# Patient Record
Sex: FEMALE | Employment: UNEMPLOYED | URBAN - METROPOLITAN AREA
[De-identification: names, ages, dates, MRNs, and addresses within clinical notes are randomized per-mention and may not be internally consistent; named-entity substitution may affect disease eponyms.]

---

## 2019-06-17 ENCOUNTER — TELEPHONE (OUTPATIENT)
Dept: DERMATOLOGY | Facility: CLINIC | Age: 34
End: 2019-06-17

## 2019-06-17 NOTE — TELEPHONE ENCOUNTER
Health Call Center    Phone Message    May a detailed message be left on voicemail: yes    Reason for Call: Other: [facility] has referred this Pt to Dr Farias but there are no schedule dates open.  [facility] requests we ask the backline if they can find anything and contact the Pt to schedule. Records being faxed today    Action Taken: Message routed to:  [facility] ([facility] dermatology

## 2019-06-18 NOTE — TELEPHONE ENCOUNTER
Pt called regarding referral for HS. She would like the clinic to be aware that she also has Diabetes and due to that and her HS, that she has multiple open wounds on her legs. Pt is requesting a call as soon as possible to schedule. Thanks!

## 2019-06-18 NOTE — TELEPHONE ENCOUNTER
Left voicemail asking to call clinic back. Clinic number provided. Patient can still schedule their new HS appt with Dr. Farias, but Dr. Farias is scheduling out into August/September right now.  To be seen sooner, patient can be seen in our resident continuity clinic. There's 2 openings next week with Dr. Vidal that we can schedule patient in, or Dr. Diaz as a new extended in July.        Shea Talamantes LPN

## 2019-07-16 NOTE — TELEPHONE ENCOUNTER
Contacted patient and offered the continuity clinic and an appointment with Dr. Diaz. She is addiment to have an appointment with Dr. Farias due to what she has heard about him. She says she is willing to wait until November if needed.  Please schedule this patient when a new HS becomes availble.    Remy Griffiths, [facility]

## 2019-07-16 NOTE — TELEPHONE ENCOUNTER
[facility] Call Center    Phone Message    May a detailed message be left on voicemail: yes    Reason for Call: Other: Patient called back to schedule an Appointment with Dr. Farias. Stated her current provider recomments him because he specializes in her condition. No appointments are available as of 07/16. Would like a call back to schedule. Please advise.      Action Taken: Message routed to:  [facility] ([facility] Dermatology

## 2019-08-25 NOTE — TELEPHONE ENCOUNTER
FUTURE VISIT INFORMATION      FUTURE VISIT INFORMATION:    Date: 10.24.19    Time: 3:30    Location: [facility]  REFERRAL INFORMATION:    Referring provider:  Dr. Antolin Chao    Referring providers clinic:  [facility]    Reason for visit/diagnosis: new HS    RECORDS REQUESTED FROM:       Clinic name Comments Records Status Imaging Status   [facility] Multiple visits with Dr. Chao over the past 2 years relating to possible HS In [facility] [place] Multiple visits in the ER over the past 2 years relating to possible HS In Epic

## 2019-10-24 ENCOUNTER — OFFICE VISIT (OUTPATIENT)
Dept: DERMATOLOGY | Facility: CLINIC | Age: 34
End: 2019-10-24

## 2019-10-24 ENCOUNTER — PRE VISIT (OUTPATIENT)
Dept: DERMATOLOGY | Facility: CLINIC | Age: 34
End: 2019-10-24

## 2019-10-24 VITALS — DIASTOLIC BLOOD PRESSURE: 86 MMHG | SYSTOLIC BLOOD PRESSURE: 129 MMHG | HEART RATE: 95 BPM

## 2019-10-24 DIAGNOSIS — L73.2 HIDRADENITIS SUPPURATIVA: Primary | ICD-10-CM

## 2019-10-24 RX ORDER — BENZOYL PEROXIDE 10 G/100G
SUSPENSION TOPICAL
Qty: 227 G | Refills: 3 | Status: SHIPPED | OUTPATIENT
Start: 2019-10-24 | End: 2020-07-29

## 2019-10-24 RX ORDER — MULTIVIT WITH IRON,MINERALS
1 TABLET,CHEWABLE ORAL DAILY
COMMUNITY

## 2019-10-24 RX ORDER — CLOBETASOL PROPIONATE 0.5 MG/G
CREAM TOPICAL 2 TIMES DAILY
Qty: 30 G | Refills: 1 | Status: SHIPPED | OUTPATIENT
Start: 2019-10-24

## 2019-10-24 RX ORDER — CLINDAMYCIN PHOSPHATE 10 UG/ML
LOTION TOPICAL 2 TIMES DAILY
Qty: 60 ML | Refills: 3 | Status: SHIPPED | OUTPATIENT
Start: 2019-10-24

## 2019-10-24 RX ORDER — SERTRALINE HYDROCHLORIDE 100 MG/1
100 TABLET, FILM COATED ORAL DAILY
COMMUNITY
Start: 2019-10-21

## 2019-10-24 RX ORDER — ASCORBIC ACID 500 MG
500 TABLET ORAL DAILY
COMMUNITY

## 2019-10-24 RX ORDER — BLOOD-GLUCOSE METER
1 EACH MISCELLANEOUS 3 TIMES DAILY
COMMUNITY
Start: 2019-06-18

## 2019-10-24 RX ORDER — SPIRONOLACTONE 50 MG/1
TABLET, FILM COATED ORAL
Qty: 60 TABLET | Refills: 3 | Status: SHIPPED | OUTPATIENT
Start: 2019-10-24 | End: 2020-01-23

## 2019-10-24 RX ORDER — METFORMIN HYDROCHLORIDE 1000 MG/1
1000 TABLET, FILM COATED, EXTENDED RELEASE ORAL 2 TIMES DAILY
COMMUNITY
Start: 2019-10-07

## 2019-10-24 RX ORDER — TOPIRAMATE 25 MG/1
25 TABLET, FILM COATED ORAL DAILY
COMMUNITY
Start: 2019-10-21

## 2019-10-24 RX ORDER — OXYCODONE HYDROCHLORIDE 5 MG/1
1 TABLET ORAL PRN
COMMUNITY
Start: 2019-06-25

## 2019-10-24 ASSESSMENT — PAIN SCALES - GENERAL: PAINLEVEL: NO PAIN (0)

## 2019-10-24 NOTE — PATIENT INSTRUCTIONS
Wash every day with benzoyl peroxide wash in shower  -for flares or inflamed areas of HS: apply clindamycin lotion 2 x per day to affected areas and also apply clobetasol 0.05% cream 1-2x per day until they resolve.  - Take 50 mg spironolactone once per day for 1 week, and then if doing well can increase to 100mg daily    Please call our clinic if you develop a flare in your HS

## 2019-10-24 NOTE — PROGRESS NOTES
[facility] Dermatology Note    Dermatology Clinic  [facility]  [facility]  [address] [place] [address]    Dermatology Problem List:  1. Hidradenitis suppurativa  - current tx: clobetasol, BPO wash, clindamycin, spironolactone 50mg BID to be increased to 100mg BID if pt tolerates it  - Currently controlling diabetes with 2000mg metformin  - A former smoker; quit about 2 years ago  - Placed referral to derm surg for HS excision of L axilla    Encounter Date: Oct 24, 2019    CC:  Chief Complaint   Patient presents with     Derm Problem     HS, groin, axillae       History of Present Illness:  Ms. Her De La Rosa is a 34 year old female who presents for evaluation of Hidradenitis suppurativa. The pt was referred by Dr. Antolin Chao of [facility] in [place]. The pt last saw Dr. Chao on 8/21/19 for evaluation for a cyst on the back of the neck, just posterior to a recent excision of epidermal cyst on the R posterior neck done on 6/25/19. However, it was noted that it may be a reactive lymph node. After removal she was referred to me for HS. Today she is accompanied by her child son and a dog. She reports experiencing HS since 15-16 years of age. Reports that she thought they were ingrown hairs at first. She has had minimal medical treatment and has been going to the ER to get this drained. Had a course of doxycycline and a course of keflex only in the past. At home, she is just washing with Dial soap. Reports that she will use pain medications to help with the HS.     Pt reports that the L axilla and L groin are most active. Reports that the L axilla is ongoing and bothers her.    Former smoker and quit about 2 years ago with significant improvement in her HS. Has also cut out dairy from her diet. She is on 2000 mg of metformin for diabetes and has taken it for 2 years now. She states that only about 6 months ago did she take it consistently,  and it has helped with the HS.    In the past 3 months the pt has not had any flare-ups. She feels that the metformin has contributed to this. However, she notes that she has changed her diet such as cutting out dairy about 6 months ago and has stopped smoking. Pt felt she had to stop smoking due to exacerbation of HS.  She is also interested in beriatric Sx.    Within 6 months, she has only had 1 flare-up where she went to the ED for I&D.    Denies that she has flare-ups with the menstrual cycle.  Has not tried prednisone or ILK.    Reports taking tumeric for anti-inflammation. Topically, she is just using neosporin; she feels that a band-aid placed over lesions also helps. Reports that her mood is good.      Social History:  Patient reports that she has quit smoking. She smoked 0.00 packs per day. She has never used smokeless tobacco.    Medications:  Current Outpatient Medications   Medication Sig Dispense Refill     albuterol (PROVENTIL) 40 mg in sodium chloride 32 mL continuous nebulization solution Inhale 1-2 puffs into the lungs as needed       Blood Glucose Monitoring Suppl (GLUCOCOM BLOOD GLUCOSE MONITOR) CANDIDO 1 Dose by Other route 3 times daily       calcium citrate-vitamin D (CITRACAL) 315-250 MG-UNIT TABS per tablet Take 2 tablets by mouth daily       childrens multivitamin w/iron (FLINTSTONES COMPLETE) chewable tablet Take 1 tablet by mouth daily       metFORMIN modified (GLUMETZA) 1000 MG 24 hr tablet Take 1,000 mg by mouth 2 times daily       oxyCODONE (ROXICODONE) 5 MG tablet Take 1 tablet by mouth as needed       sertraline (ZOLOFT) 100 MG tablet Take 100 mg by mouth daily       topiramate (TOPAMAX) 25 MG tablet Take 25 mg by mouth daily       vitamin C (ASCORBIC ACID) 500 MG tablet Take 500 mg by mouth daily         No Known Allergies    Review of Systems:  -As per HPI  -Constitutional: Otherwise feeling well today, in usual state of health.  -Skin: As above in HPI. No additional skin  concerns.    Physical exam:  Vitals: /86 (BP Location: Right arm)   Pulse 95   GEN: This is a well developed, well-nourished female in no acute distress, in a pleasant mood.    SKIN: Focused examination of the axillas bilaterally, inframammaries bilaterally, lower extremities bilaterally, groin and neck were performed.  -Acanthosis nigricans with 3x pink PG-like papules in the R axilla  -L axilla, acanthosis nigricans. There is a cord in the center that is a bit thickened and mildly tender to palpation, 1+ erythema and 1+ induration  -Open comedones in inframammary  -Tunnel in L inferior axilla that is 6 cm in diameter  -3x puncta mildly hyperpigmented flat papules on L inframammary region, not currently active  -R upper thigh there are 2x violaceous nodules  -Suprapubic region 4x violaceous nodules  -L upper thigh, 4x inflammatory nodules with 1x draining  -Well-healed scar on posterior neck, few tombstone comedones on the back of the neck  -No other lesions of concern on areas examined.       Impression/Plan:  1. Hidradenitis suppurativa.    Start spironolactone 50 mg daily, to be increased to 100 mg daily after a week if pt tolerates it. Discussed the risks and benefits of spironolactone, and pt would like to proceed.    Prescribed clobetasol 0.05% cream on individual lesions BID prn flares.    Prescribed BPO 10% wash.    Prescribed clindamycin 1% gel for active lesions.    For more severe flare-ups, pt was counseled to call here. Discussed various ways to move forward with flare-ups such as prednisone taper, ILK injections, or excision.    Referral placed to derm surgery for L axilla.    She will find a dermatologist in her area, [place], for ongoing care, and when she lets us know who this is, we can call and touch base with them.    CC Referred MD Gopal  No address on file on close of this encounter.    Follow-up in 3 months.      Staff Involved:    Scribe Disclosure  I, Ramona De La Rosa, am serving as a  scribe to document services personally performed by Dr. Nemesio Farias, based on data collection and the provider's statements to me.     Provider Disclosure:   The documentation recorded by the scribe accurately reflects the services I personally performed and the decisions made by me.    Nemesio Farias MD, FAAD    Departments of Internal Medicine and Dermatology  [facility]  [phone]

## 2019-10-24 NOTE — LETTER
10/24/2019       RE: Her De La Rosa  [address] [place] [address]     Dear Colleague,    Thank you for referring your patient, Her Rj, to the [facility] DERMATOLOGY at [facility]. Please see a copy of my visit note below.    [facility] Dermatology Note    Dermatology Clinic  [facility]  [address] [place] [address]    Dermatology Problem List:  1. Hidradenitis suppurativa  - current tx: clobetasol, BPO wash, clindamycin, spironolactone 50mg BID to be increased to 100mg BID if pt tolerates it  - Currently controlling diabetes with 2000mg metformin  - A former smoker; quit about 2 years ago  - Placed referral to derm surg for HS excision of L axilla    Encounter Date: Oct 24, 2019    CC:  Chief Complaint   Patient presents with     Derm Problem     HS, groin, axillae       History of Present Illness:  Ms. Her De La Rosa is a 34 year old female who presents for evaluation of Hidradenitis suppurativa. The pt was referred by Dr. Antolin Chao of [facility] in [place]. The pt last saw Dr. Chao on 8/21/19 for evaluation for a cyst on the back of the neck, just posterior to a recent excision of epidermal cyst on the R posterior neck done on 6/25/19. However, it was noted that it may be a reactive lymph node. After removal she was referred to me for HS. Today she is accompanied by her child son and a dog. She reports experiencing HS since 15-18 years of age. Reports that she thought they were ingrown hairs at first. She has had minimal medical treatment and has been going to the ER to get this drained. Had a course of doxycycline and a course of keflex only in the past. At home, she is just washing with Dial soap. Reports that she will use pain medications to help with the HS.     Pt reports that the L axilla and L groin are most active. Reports that the L axilla is ongoing and bothers  her.    Former smoker and quit about 2 years ago with significant improvement in her HS. Has also cut out dairy from her diet. She is on 2000 mg of metformin for diabetes and has taken it for 2 years now. She states that only about 6 months ago did she take it consistently, and it has helped with the HS.    In the past 3 months the pt has not had any flare-ups. She feels that the metformin has contributed to this. However, she notes that she has changed her diet such as cutting out dairy about 6 months ago and has stopped smoking. Pt felt she had to stop smoking due to exacerbation of HS.  She is also interested in beriatric Sx.    Within 6 months, she has only had 1 flare-up where she went to the ED for I&D.    Denies that she has flare-ups with the menstrual cycle.  Has not tried prednisone or ILK.    Reports taking tumeric for anti-inflammation. Topically, she is just using neosporin; she feels that a band-aid placed over lesions also helps. Reports that her mood is good.      Social History:  Patient reports that she has quit smoking. She smoked 0.00 packs per day. She has never used smokeless tobacco.    Medications:  Current Outpatient Medications   Medication Sig Dispense Refill     albuterol (PROVENTIL) 40 mg in sodium chloride 32 mL continuous nebulization solution Inhale 1-2 puffs into the lungs as needed       Blood Glucose Monitoring Suppl (GLUCOCOM BLOOD GLUCOSE MONITOR) CANDIDO 1 Dose by Other route 3 times daily       calcium citrate-vitamin D (CITRACAL) 315-250 MG-UNIT TABS per tablet Take 2 tablets by mouth daily       childrens multivitamin w/iron (FLINTSTONES COMPLETE) chewable tablet Take 1 tablet by mouth daily       metFORMIN modified (GLUMETZA) 1000 MG 24 hr tablet Take 1,000 mg by mouth 2 times daily       oxyCODONE (ROXICODONE) 5 MG tablet Take 1 tablet by mouth as needed       sertraline (ZOLOFT) 100 MG tablet Take 100 mg by mouth daily       topiramate (TOPAMAX) 25 MG tablet Take 25 mg by  mouth daily       vitamin C (ASCORBIC ACID) 500 MG tablet Take 500 mg by mouth daily         No Known Allergies    Review of Systems:  -As per HPI  -Constitutional: Otherwise feeling well today, in usual state of health.  -Skin: As above in HPI. No additional skin concerns.    Physical exam:  Vitals: /86 (BP Location: Right arm)   Pulse 95   GEN: This is a well developed, well-nourished female in no acute distress, in a pleasant mood.    SKIN: Focused examination of the axillas bilaterally, inframammaries bilaterally, lower extremities bilaterally, groin and neck were performed.  -Acanthosis nigricans with 3x pink PG-like papules in the R axilla  -L axilla, acanthosis nigricans. There is a cord in the center that is a bit thickened and mildly tender to palpation, 1+ erythema and 1+ induration  -Open comedones in inframammary  -Tunnel in L inferior axilla that is 6 cm in diameter  -3x puncta mildly hyperpigmented flat papules on L inframammary region, not currently active  -R upper thigh there are 2x violaceous nodules  -Suprapubic region 4x violaceous nodules  -L upper thigh, 4x inflammatory nodules with 1x draining  -Well-healed scar on posterior neck, few tombstone comedones on the back of the neck  -No other lesions of concern on areas examined.       Impression/Plan:  1. Hidradenitis suppurativa.    Start spironolactone 50 mg daily, to be increased to 100 mg daily after a week if pt tolerates it. Discussed the risks and benefits of spironolactone, and pt would like to proceed.    Prescribed clobetasol 0.05% cream on individual lesions BID prn flares.    Prescribed BPO 10% wash.    Prescribed clindamycin 1% gel for active lesions.    For more severe flare-ups, pt was counseled to call here. Discussed various ways to move forward with flare-ups such as prednisone taper, ILK injections, or excision.    Referral placed to derm surgery for L axilla.    She will find a dermatologist in her area, [place], for  ongoing care, and when she lets us know who this is, we can call and touch base with them.    CC Referred Self, MD  No address on file on close of this encounter.    Follow-up in 3 months.      Staff Involved:    Scribe Disclosure  I, Ramona Rj, am serving as a scribe to document services personally performed by Dr. Nemesio Farias, based on data collection and the provider's statements to me.     Provider Disclosure:   The documentation recorded by the scribe accurately reflects the services I personally performed and the decisions made by me.    Nemesio Farias MD, FAAD    Departments of Internal Medicine and Dermatology  [facility]  [phone]

## 2019-10-24 NOTE — NURSING NOTE
Chief Complaint   Patient presents with     Derm Problem     HS, groin, axillae     Carla Rodriguez, EMT

## 2019-10-25 ENCOUNTER — TELEPHONE (OUTPATIENT)
Dept: DERMATOLOGY | Facility: CLINIC | Age: 34
End: 2019-10-25

## 2019-11-06 NOTE — TELEPHONE ENCOUNTER
FUTURE VISIT INFORMATION      FUTURE VISIT INFORMATION:    Date: 11.13.19    Time: 2:00    Location:  DermSurg  REFERRAL INFORMATION:    Referring provider:  Dr. Nemesio Farias    Referring providers clinic:  [facility]    Reason for visit/diagnosis  HS    RECORDS REQUESTED FROM:       Clinic name Comments Records Status Imaging Status   [facility] 10.24.19 Dr. Farias Epic    [facility] Multiple visits with Dr. Chao over the past 2 years relating to possible HS  [facility] Multiple visits in the ER over the past 2 years relating to possible HS  Epic

## 2019-11-13 ENCOUNTER — PRE VISIT (OUTPATIENT)
Dept: DERMATOLOGY | Facility: CLINIC | Age: 34
End: 2019-11-13

## 2019-11-13 ENCOUNTER — OFFICE VISIT (OUTPATIENT)
Dept: DERMATOLOGY | Facility: CLINIC | Age: 34
End: 2019-11-13

## 2019-11-13 DIAGNOSIS — L73.2 HIDRADENITIS SUPPURATIVA: Primary | ICD-10-CM

## 2019-11-13 ASSESSMENT — PAIN SCALES - GENERAL: PAINLEVEL: MILD PAIN (2)

## 2019-11-13 NOTE — NURSING NOTE
Chief Complaint   Patient presents with     Consult     Her is here today for a consult for HS excisions for her underarms and groin area.      Felicia Clancy, CMA

## 2019-11-13 NOTE — LETTER
11/13/2019       RE: Her De La Rosa  [address] [address]  [address]  [place] [address]     Dear Colleague,    Thank you for referring your patient, Her Rj, to the [facility] DERMATOLOGIC SURGERY at [facility]. Please see a copy of my visit note below.    [facility] Dermatology Note  Dermatology Surgery Clinic  [facility]  [address] [place] [address]    Dermatology Problem List:  1. Hidradenitis suppurativa  - current tx: clobetasol, BPO wash, clindamycin, spironolactone 50mg BID to be increased to 100mg BID if pt tolerates it  - Currently controlling diabetes with 2000mg metformin  - A former smoker; quit about 2 years ago  - Hx of excision at the neck  - s/p excision of the L axilla TBD    Encounter Date: Nov 13, 2019  Chief Complaint   Patient presents with     Consult     Her is here today for a consult for HS excisions for her underarms and groin area.      History of Present Illness:  Ms. Her De La Rosa is a 34 year old female who presents today for consultation of an HS excision on the L axilla. The patient was referred by Dr. Farias of  Derm. She last saw Dr. Farias on 10/24/19 for an HS consult. At the time she reported active regions at the L axilla and L groin and opted to pursue excision of the L axilla. At today's visit she continues having drainage at the L axilla. It has been draining for about 6 months now. She has had HS excision on the neck prior, and it has resolved since then. No artificial joints or implants. She is planning to undergoing bariatric Sx in February.The patient is otherwise feeling well. There are no other skin concerns at this time.    Past Medical History:   There is no problem list on file for this patient.    No past medical history on file.  No past surgical history on file.    Social History:  Social History     Socioeconomic History     Marital status: Single     Spouse  name: None     Number of children: None     Years of education: None     Highest education level: None   Occupational History     None   Social Needs     Financial resource strain: None     Food insecurity:     Worry: None     Inability: None     Transportation needs:     Medical: None     Non-medical: None   Tobacco Use     Smoking status: Former Smoker     Packs/day: 0.00     Smokeless tobacco: Never Used   Substance and Sexual Activity     Alcohol use: None     Drug use: None     Sexual activity: None   Lifestyle     Physical activity:     Days per week: None     Minutes per session: None     Stress: None   Relationships     Social connections:     Talks on phone: None     Gets together: None     Attends [employer] service: None     Active member of club or organization: None     Attends meetings of clubs or organizations: None     Relationship status: None     Intimate partner violence:     Fear of current or ex partner: None     Emotionally abused: None     Physically abused: None     Forced sexual activity: None   Other Topics Concern     Parent/sibling w/ CABG, MI or angioplasty before 65F 55M? Not Asked   Social History Narrative     None     Family History:  No family history on file.     Medications:  Current Outpatient Medications   Medication Sig Dispense Refill     albuterol (PROVENTIL) 40 mg in sodium chloride 32 mL continuous nebulization solution Inhale 1-2 puffs into the lungs as needed       benzoyl peroxide (PANOXYL) 10 % external liquid Use daily as directed in shower 227 g 3     Blood Glucose Monitoring Suppl (GLUCOCOM BLOOD GLUCOSE MONITOR) CANDIDO 1 Dose by Other route 3 times daily       calcium citrate-vitamin D (CITRACAL) 315-250 MG-UNIT TABS per tablet Take 2 tablets by mouth daily       childrens multivitamin w/iron (FLINTSTONES COMPLETE) chewable tablet Take 1 tablet by mouth daily       clindamycin (CLEOCIN T) 1 % external lotion Apply topically 2 times daily Apply to affected and inflamed  areas of HS 60 mL 3     clobetasol (TEMOVATE) 0.05 % external cream Apply topically 2 times daily Apply a thin layer to active and inflamed areas of HS 1-2 x per day until they improve. 30 g 1     metFORMIN modified (GLUMETZA) 1000 MG 24 hr tablet Take 1,000 mg by mouth 2 times daily       oxyCODONE (ROXICODONE) 5 MG tablet Take 1 tablet by mouth as needed       sertraline (ZOLOFT) 100 MG tablet Take 100 mg by mouth daily       spironolactone (ALDACTONE) 50 MG tablet Take 50mg (1 tab) daily for 1 week, and if tolerating increase to 100mg (tabs) daily 60 tablet 3     topiramate (TOPAMAX) 25 MG tablet Take 25 mg by mouth daily       vitamin C (ASCORBIC ACID) 500 MG tablet Take 500 mg by mouth daily       No Known Allergies    Review of Systems:  As per HPI.  -Skin Establ Pt: The patient denies any new rash, pruritus, or lesions that are symptomatic, changing or bleeding, except as per HPI.  -Constitutional: The patient is feeling generally well.    Physical exam:  Vitals: There were no vitals taken for this visit.  GEN: This is a well developed, well-nourished female in no acute distress, in a pleasant mood.   SKIN: Focused examination of the face, neck, and axillas bilaterally, suprapubic bilaterally, inguinal folds bilaterally were performed.  -Relative well-healed scar on the R lateral neck  -Scarred nodules, sinus tracts, and chords overlying the R axilla Area of structural disease measures 10.5 x 6.0 cm and there is 1x active draining sinus. Open 1.0 cm sinus with granulating base over medial axilla  -Scarred nodules, sinus tracts, and chords overlying the L axilla Area of structural disease measures 9.5 x 11.0 cm and there is 1x active draining sinus. Open 1.0 cm sinus with granulating base over medial axilla  -Inflammatory nodules and draining sinuses over the infrapannus and suprapubic areas  -Bilateral inguinal folds with scarred nodules and draining sinus tracts, L>R  - No other lesions of concern on areas  examined.     Impression/Plan:  1. Hidradenitis suppurativa. Excision recommended of the L axilla, 10.5 x 6.0 cm structural disease site.    Reviewed pathology report and nature of diagnosis. Plan for excision on L axilla first. She is also planning to undergo bariatric Sx in February.    Risks, benefits, and process of HS excision surgery were discussed including possibility of damage to surrounding anatomical structures and infection. Patient is comfortable proceeding with the surgery; consent form was obtained. She will be scheduled at her convenience.    No indication for pre-op antibiotics.    Pre-op written instructions provided.       Follow-up as scheduled for excision.     Staff Involved:  Scribe Disclosure  I, Ramona De La Rosa, am serving as a scribe to document services personally performed by Dr. Anjel Recio, based on data collection and the provider's statements to me.     Attending Attestation  I attest that the Scribe recorded the interview and exam that I personally performed.  I have reviewed the note and edited it as necessary.    Anjel Recio M.D.  Professor  Director of Dermatologic Surgery  Department of Dermatology  [facility]

## 2019-11-13 NOTE — PROGRESS NOTES
[facility] Dermatology Note    Dermatology Surgery Clinic  [facility]  Clinics and Surgery Center  [address] [place] [id number]    Dermatology Problem List:  1. Hidradenitis suppurativa  - current tx: clobetasol, BPO wash, clindamycin, spironolactone 50mg BID to be increased to 100mg BID if pt tolerates it  - Currently controlling diabetes with 2000mg metformin  - A former smoker; quit about 2 years ago  - Hx of excision at the neck  - s/p excision of the L axilla TBD    Encounter Date: Nov 13, 2019    CC:  Chief Complaint   Patient presents with     Consult     Her is here today for a consult for HS excisions for her underarms and groin area.        History of Present Illness:  Ms. Her De La Rosa is a 34 year old female who presents today for consultation of an HS excision on the L axilla. The patient was referred by Dr. Farias of  Derm. She last saw Dr. Farias on 10/24/19 for an HS consult. At the time she reported active regions at the L axilla and L groin and opted to pursue excision of the L axilla. At today's visit she continues having drainage at the L axilla. It has been draining for about 6 months now. She has had HS excision on the neck prior, and it has resolved since then. No artificial joints or implants. She is planning to undergoing bariatric Sx in February.The patient is otherwise feeling well. There are no other skin concerns at this time.      Past Medical History:   There is no problem list on file for this patient.    No past medical history on file.  No past surgical history on file.    Social History:  Social History     Socioeconomic History     Marital status: Single     Spouse name: None     Number of children: None     Years of education: None     Highest education level: None   Occupational History     None   Social Needs     Financial resource strain: None     Food insecurity:     Worry: None     Inability: None     Transportation needs:      Medical: None     Non-medical: None   Tobacco Use     Smoking status: Former Smoker     Packs/day: 0.00     Smokeless tobacco: Never Used   Substance and Sexual Activity     Alcohol use: None     Drug use: None     Sexual activity: None   Lifestyle     Physical activity:     Days per week: None     Minutes per session: None     Stress: None   Relationships     Social connections:     Talks on phone: None     Gets together: None     Attends [employer] service: None     Active member of club or organization: None     Attends meetings of clubs or organizations: None     Relationship status: None     Intimate partner violence:     Fear of current or ex partner: None     Emotionally abused: None     Physically abused: None     Forced sexual activity: None   Other Topics Concern     Parent/sibling w/ CABG, MI or angioplasty before 65F 55M? Not Asked   Social History Narrative     None       Family History:  No family history on file.     Medications:  Current Outpatient Medications   Medication Sig Dispense Refill     albuterol (PROVENTIL) 40 mg in sodium chloride 32 mL continuous nebulization solution Inhale 1-2 puffs into the lungs as needed       benzoyl peroxide (PANOXYL) 10 % external liquid Use daily as directed in shower 227 g 3     Blood Glucose Monitoring Suppl (GLUCOCOM BLOOD GLUCOSE MONITOR) CANDIDO 1 Dose by Other route 3 times daily       calcium citrate-vitamin D (CITRACAL) 315-250 MG-UNIT TABS per tablet Take 2 tablets by mouth daily       childrens multivitamin w/iron (FLINTSTONES COMPLETE) chewable tablet Take 1 tablet by mouth daily       clindamycin (CLEOCIN T) 1 % external lotion Apply topically 2 times daily Apply to affected and inflamed areas of HS 60 mL 3     clobetasol (TEMOVATE) 0.05 % external cream Apply topically 2 times daily Apply a thin layer to active and inflamed areas of HS 1-2 x per day until they improve. 30 g 1     metFORMIN modified (GLUMETZA) 1000 MG 24 hr tablet Take 1,000 mg by  mouth 2 times daily       oxyCODONE (ROXICODONE) 5 MG tablet Take 1 tablet by mouth as needed       sertraline (ZOLOFT) 100 MG tablet Take 100 mg by mouth daily       spironolactone (ALDACTONE) 50 MG tablet Take 50mg (1 tab) daily for 1 week, and if tolerating increase to 100mg (tabs) daily 60 tablet 3     topiramate (TOPAMAX) 25 MG tablet Take 25 mg by mouth daily       vitamin C (ASCORBIC ACID) 500 MG tablet Take 500 mg by mouth daily         No Known Allergies    Review of Systems:  As per HPI.  -Skin Establ Pt: The patient denies any new rash, pruritus, or lesions that are symptomatic, changing or bleeding, except as per HPI.  -Constitutional: The patient is feeling generally well.    Physical exam:  Vitals: There were no vitals taken for this visit.  GEN: This is a well developed, well-nourished female in no acute distress, in a pleasant mood.   SKIN: Focused examination of the face, neck, and axillas bilaterally, suprapubic bilaterally, inguinal folds bilaterally were performed.  -Relative well-healed scar on the R lateral neck  -Scarred nodules, sinus tracts, and chords overlying the R axilla Area of structural disease measures 10.5 x 6.0 cm and there is 1x active draining sinus. Open 1.0 cm sinus with granulating base over medial axilla  -Scarred nodules, sinus tracts, and chords overlying the L axilla Area of structural disease measures 9.5 x 11.0 cm and there is 1x active draining sinus. Open 1.0 cm sinus with granulating base over medial axilla  -Inflammatory nodules and draining sinuses over the infrapannus and suprapubic areas  -Bilateral inguinal folds with scarred nodules and draining sinus tracts, L>R  - No other lesions of concern on areas examined.       Impression/Plan:  1. Hidradenitis suppurativa. Excision recommended of the L axilla, 10.5 x 6.0 cm structural disease site.    Reviewed pathology report and nature of diagnosis. Plan for excision on L axilla first. She is also planning to undergo  bariatric Sx in February.    Risks, benefits, and process of HS excision surgery were discussed including possibility of damage to surrounding anatomical structures and infection. Patient is comfortable proceeding with the surgery; consent form was obtained. She will be scheduled at her convenience.    No indication for pre-op antibiotics.    Pre-op written instructions provided.     Follow-up as scheduled for excision.       Staff Involved:    Scribe Disclosure  I, Ramona Rj, am serving as a scribe to document services personally performed by Dr. Anjel Recio, based on data collection and the provider's statements to me.     Attending Attestation  I attest that the Scribe recorded the interview and exam that I personally performed.  I have reviewed the note and edited it as necessary.    Anjel Recio M.D.  Professor  Director of Dermatologic Surgery  Department of Dermatology  [facility]

## 2019-12-10 ENCOUNTER — TELEPHONE (OUTPATIENT)
Dept: DERMATOLOGY | Facility: CLINIC | Age: 34
End: 2019-12-10

## 2019-12-10 DIAGNOSIS — L73.2 HIDRADENITIS SUPPURATIVA: Primary | ICD-10-CM

## 2019-12-10 RX ORDER — DOXYCYCLINE 100 MG/1
100 CAPSULE ORAL 2 TIMES DAILY
Qty: 28 CAPSULE | Refills: 0 | Status: SHIPPED | OUTPATIENT
Start: 2019-12-10

## 2019-12-11 NOTE — TELEPHONE ENCOUNTER
Received page as resident on call for HS flare. Patient states that she has been flaring significantly under the stomach for about 2-3 days. She has been unable to move and it is very painful. She has been taking spironolactone and using topicals. It had been well controlled until recently. She would like to go to her local urgent care clinic [facility] to have kenalog injections and possible I&D. Will send prescription for doxycycline 100mg BID x 14 days to help with flare. Sent message to scheduling pool to have patient be seen in clinic tomorrow if possible since she will be in [place]. She will be seeing Dr. Recio on 12/16/19 for excision of left axilla.

## 2019-12-11 NOTE — TELEPHONE ENCOUNTER
Called pt and she said that she will not be able to come in today due to other obligations. Pt states that tomorrow would work better. I have her scheduled to see Dr. Navas on 12/12/19 at 4:30PM. Pt understood and had no questions or concerns.  HERLINDA Chung

## 2019-12-16 ENCOUNTER — TELEPHONE (OUTPATIENT)
Dept: DERMATOLOGY | Facility: CLINIC | Age: 34
End: 2019-12-16

## 2019-12-16 NOTE — TELEPHONE ENCOUNTER
Called patient back after she called stating that she would not be able to make appointment on time today. Dr. Recio wants her to reschedule. Pt is rescheduled for January. Pt also stated that she is flaring and needs to be seen soon, preferably with Dr. Farias. Will route message to Dr. Farias. Pt understood, no other concerns or questions.

## 2019-12-19 ENCOUNTER — OFFICE VISIT (OUTPATIENT)
Dept: DERMATOLOGY | Facility: CLINIC | Age: 34
End: 2019-12-19
Payer: COMMERCIAL

## 2019-12-19 DIAGNOSIS — L73.2 HIDRADENITIS SUPPURATIVA: Primary | ICD-10-CM

## 2019-12-19 RX ORDER — OXYCODONE HYDROCHLORIDE 5 MG/1
5 TABLET ORAL EVERY 6 HOURS PRN
Qty: 12 TABLET | Refills: 0 | Status: SHIPPED | OUTPATIENT
Start: 2019-12-19

## 2019-12-19 ASSESSMENT — PAIN SCALES - GENERAL: PAINLEVEL: SEVERE PAIN (6)

## 2019-12-19 NOTE — PROGRESS NOTES
oxycDermatology Clinic  [facility]  Clinics and Surgery Center  [address] [place] [address]     Dermatology Problem List:  1. Hidradenitis suppurativa  - current tx: clobetasol, BPO wash, clindamycin, spironolactone 50mg BID to be increased to 100mg BID if pt tolerates it  - Currently controlling diabetes with 2000mg metformin  - A former smoker; quit about 2 years ago  - Placed referral to derm surg for HS excision of L axilla     Encounter Date: December 19, 2019  LV: Oct 24, 2019     CC:       Chief Complaint   Patient presents with     Derm Problem       HS, groin, axillae      Interval Hx    She currently gets 1-2 a month   She flared in the rt groin. Went to ED and opened it and injected it with steroid which helped. It started coming back and she wants to get another injection    History of Present Illness:  Ms. Her De La Rsoa is a 34 year old female who presents for evaluation of Hidradenitis suppurativa. The pt was referred by Dr. Antolin Chao of [facility] in [place]. The pt last saw Dr. Chao on 8/21/19 for evaluation for a cyst on the back of the neck, just posterior to a recent excision of epidermal cyst on the R posterior neck done on 6/25/19. However, it was noted that it may be a reactive lymph node. After removal she was referred to me for HS. Today she is accompanied by her child son and a dog. She reports experiencing HS since 15-16 years of age. Reports that she thought they were ingrown hairs at first. She has had minimal medical treatment and has been going to the ER to get this drained. Had a course of doxycycline and a course of keflex only in the past. At home, she is just washing with Dial soap. Reports that she will use pain medications to help with the HS.      Pt reports that the L axilla and L groin are most active. Reports that the L axilla is ongoing and bothers her.     Former smoker and quit about 2 years ago with significant improvement in  her HS. Has also cut out dairy from her diet. She is on 2000 mg of metformin for diabetes and has taken it for 2 years now. She states that only about 6 months ago did she take it consistently, and it has helped with the HS.     In the past 3 months the pt has not had any flare-ups. She feels that the metformin has contributed to this. However, she notes that she has changed her diet such as cutting out dairy about 6 months ago and has stopped smoking. Pt felt she had to stop smoking due to exacerbation of HS.  She is also interested in beriatric Sx.     Within 6 months, she has only had 1 flare-up where she went to the ED for I&D.     Denies that she has flare-ups with the menstrual cycle.  Has not tried prednisone or ILK.     Reports taking tumeric for anti-inflammation. Topically, she is just using neosporin; she feels that a band-aid placed over lesions also helps. Reports that her mood is good.        Social History:  Patient reports that she has quit smoking. She smoked 0.00 packs per day. She has never used smokeless tobacco.     Current Outpatient Medications   Medication     oxyCODONE (ROXICODONE) 5 MG tablet     albuterol (PROVENTIL) 40 mg in sodium chloride 32 mL continuous nebulization solution     benzoyl peroxide (PANOXYL) 10 % external liquid     Blood Glucose Monitoring Suppl (GLUCOCOM BLOOD GLUCOSE MONITOR) CANDIDO     calcium citrate-vitamin D (CITRACAL) 315-250 MG-UNIT TABS per tablet     childrens multivitamin w/iron (FLINTSTONES COMPLETE) chewable tablet     clindamycin (CLEOCIN T) 1 % external lotion     clobetasol (TEMOVATE) 0.05 % external cream     doxycycline monohydrate (MONODOX) 100 MG capsule     metFORMIN modified (GLUMETZA) 1000 MG 24 hr tablet     oxyCODONE (ROXICODONE) 5 MG tablet     sertraline (ZOLOFT) 100 MG tablet     spironolactone (ALDACTONE) 50 MG tablet     topiramate (TOPAMAX) 25 MG tablet     vitamin C (ASCORBIC ACID) 500 MG tablet     Current Facility-Administered Medications    Medication     triamcinolone (KENALOG-40) injection 40 mg       No Known Allergies    Review of Systems:  -As per HPI  -Constitutional: Otherwise feeling well today, in usual state of health.  -Skin: As above in HPI. No additional skin concerns.     Physical exam:    GEN: This is a well developed, well-nourished female in no acute distress, in a pleasant mood.    SKIN: Focused examination of lower abdomen/suprapubic region focal exam   - Two inflammatory nodules in suprapubic region  -No other lesions of concern on areas examined.        Impression/Plan:  1. Hidradenitis suppurativa.    Continue  spironolactone 100 mg daily.     Injected two nodules with 1cc each of kenalog-20    Continue topical clobetasol 0.05% cream on individual lesions BID prn flares.    Continue BPO 10% wash.    Continue clindamycin 1% gel for active lesions.    Given oxycodone 12 pills for severe flares     Referral placed at last visit to derm surgery for L axilla.    She will find a dermatologist in her area, [place], for ongoing care, and when she lets us know who this is, we can call and touch base with them.     CC Referred Self, MD  No address on file on close of this encounter.     Follow-up in 1 month        Staff Involved:     Scribe Disclosure  I, Ramona De La Rosa, am serving as a scribe to document services personally performed by Dr. Nemesio Farias, based on data collection and the provider's statements to me.      Provider Disclosure:   The documentation recorded by the scribe accurately reflects the services I personally performed and the decisions made by me.     Nemesio Farias MD, FAAD    Departments of Internal Medicine and Dermatology  [facility]  [phone]

## 2019-12-19 NOTE — NURSING NOTE
Drug Administration Record    Prior to injection, verified patient identity using patient's name and date of birth.  Due to injection administration, patient instructed to remain in clinic for 15 minutes  afterwards, and to report any adverse reaction to me immediately.    Drug Name: triamcinolone acetonide(kenalog)  Dose: 2mL of triamcinolone 20mg/mL, 40mg dose  Route administered: ID  NDC #: [id number]: Kenalog-40 ([address]-1)  Amount of waste(mL):0  Reason for waste: Multi dose vial    LOT #: [id number]  SITE: body  : [employer]  EXPIRATION DATE: 07/2021

## 2019-12-19 NOTE — LETTER
12/19/2019       RE: Her De La Rosa  [address] [address]  [address]  [place] [address]     Dear Colleague,    Thank you for referring your patient, Her Rj, to the [facility] DERMATOLOGY at [facility]. Please see a copy of my visit note below.    Dermatology Clinic  [facility]  Clinics and Surgery Center  [address] [place] [id number]     Dermatology Problem List:  1. Hidradenitis suppurativa  - current tx: clobetasol, BPO wash, clindamycin, spironolactone 50mg BID to be increased to 100mg BID if pt tolerates it  - Currently controlling diabetes with 2000mg metformin  - A former smoker; quit about 2 years ago  - Placed referral to derm surg for HS excision of L axilla     Encounter Date:  December 19, 2019  LV: Oct 24, 2019     CC:       Chief Complaint   Patient presents with     Derm Problem       HS, groin, axillae      Interval Hx    She currently gets 1-2 a month   She flared in the rt groin. Went to ED and opened it and injected it with steroid which helped. It started coming back and she wants to get another injection    History of Present Illness:  Ms. Her De La Rosa is a 34 year old female who presents for evaluation of Hidradenitis suppurativa. The pt was referred by Dr. Antolin Chao of [facility] in [place]. The pt last saw Dr. Chao on 8/21/19 for evaluation for a cyst on the back of the neck, just posterior to a recent excision of epidermal cyst on the R posterior neck done on 6/25/19. However, it was noted that it may be a reactive lymph node. After removal she was referred to me for HS. Today she is accompanied by her child son and a dog. She reports experiencing HS since 15-18 years of age. Reports that she thought they were ingrown hairs at first. She has had minimal medical treatment and has been going to the ER to get this drained. Had a course of doxycycline and a course of keflex only in the past. At home, she is just washing with  Dial soap. Reports that she will use pain medications to help with the HS.      Pt reports that the L axilla and L groin are most active. Reports that the L axilla is ongoing and bothers her.     Former smoker and quit about 2 years ago with significant improvement in her HS. Has also cut out dairy from her diet. She is on 2000 mg of metformin for diabetes and has taken it for 2 years now. She states that only about 6 months ago did she take it consistently, and it has helped with the HS.     In the past 3 months the pt has not had any flare-ups. She feels that the metformin has contributed to this. However, she notes that she has changed her diet such as cutting out dairy about 6 months ago and has stopped smoking. Pt felt she had to stop smoking due to exacerbation of HS.  She is also interested in beriatric Sx.     Within 6 months, she has only had 1 flare-up where she went to the ED for I&D.     Denies that she has flare-ups with the menstrual cycle.  Has not tried prednisone or ILK.     Reports taking tumeric for anti-inflammation. Topically, she is just using neosporin; she feels that a band-aid placed over lesions also helps. Reports that her mood is good.     Social History:  Patient reports that she has quit smoking. She smoked 0.00 packs per day. She has never used smokeless tobacco.     Current Outpatient Medications   Medication     oxyCODONE (ROXICODONE) 5 MG tablet     albuterol (PROVENTIL) 40 mg in sodium chloride 32 mL continuous nebulization solution     benzoyl peroxide (PANOXYL) 10 % external liquid     Blood Glucose Monitoring Suppl (GLUCOCOM BLOOD GLUCOSE MONITOR) CANDIDO     calcium citrate-vitamin D (CITRACAL) 315-250 MG-UNIT TABS per tablet     childrens multivitamin w/iron (FLINTSTONES COMPLETE) chewable tablet     clindamycin (CLEOCIN T) 1 % external lotion     clobetasol (TEMOVATE) 0.05 % external cream     doxycycline monohydrate (MONODOX) 100 MG capsule     metFORMIN modified (GLUMETZA)  1000 MG 24 hr tablet     oxyCODONE (ROXICODONE) 5 MG tablet     sertraline (ZOLOFT) 100 MG tablet     spironolactone (ALDACTONE) 50 MG tablet     topiramate (TOPAMAX) 25 MG tablet     vitamin C (ASCORBIC ACID) 500 MG tablet     Current Facility-Administered Medications   Medication     triamcinolone (KENALOG-40) injection 40 mg     No Known Allergies    Review of Systems:  -As per HPI  -Constitutional: Otherwise feeling well today, in usual state of health.  -Skin: As above in HPI. No additional skin concerns.     Physical exam:    GEN: This is a well developed, well-nourished female in no acute distress, in a pleasant mood.    SKIN: Focused examination of lower abdomen/suprapubic region focal exam   - Two inflammatory nodules in suprapubic region  -No other lesions of concern on areas examined.     Impression/Plan:  1. Hidradenitis suppurativa.    Continue  spironolactone  100 mg daily. Discussed the risks and benefits of spironolactone, and pt would like to proceed.    Continue topical clobetasol 0.05% cream on individual lesions BID prn flares.    Continue BPO 10% wash.    Continue clindamycin 1% gel for active lesions.    Given oxycodone 12 pills for severe flares     Referral placed at last visit to derm surgery for L axilla.    She will find a dermatologist in her area, [place], for ongoing care, and when she lets us know who this is, we can call and touch base with them.     CC Referred Self, MD  No address on file on close of this encounter.     Follow-up in  1 month        Staff Involved:  Scribe Disclosure  I, Ramona De La Rosa, am serving as a scribe to document services personally performed by Dr. Nemesio Farias, based on data collection and the provider's statements to me.      Provider Disclosure:   The documentation recorded by the scribe accurately reflects the services I personally performed and the decisions made by me.     Nemesio Farias MD, FAAD    Departments of Internal Medicine and  Dermatology  [facility]  [phone]

## 2019-12-20 ENCOUNTER — TELEPHONE (OUTPATIENT)
Dept: DERMATOLOGY | Facility: CLINIC | Age: 34
End: 2019-12-20

## 2019-12-20 DIAGNOSIS — L73.2 HIDRADENITIS SUPPURATIVA: Primary | ICD-10-CM

## 2019-12-20 RX ORDER — CLINDAMYCIN PHOSPHATE 10 MG/G
GEL TOPICAL 2 TIMES DAILY
Qty: 60 G | Refills: 3 | Status: SHIPPED | OUTPATIENT
Start: 2019-12-20

## 2019-12-20 RX ORDER — TRIAMCINOLONE ACETONIDE 40 MG/ML
40 INJECTION, SUSPENSION INTRA-ARTICULAR; INTRAMUSCULAR ONCE
Status: ACTIVE | OUTPATIENT
Start: 2019-12-20

## 2019-12-20 RX ORDER — CLOBETASOL PROPIONATE 0.5 MG/G
CREAM TOPICAL 2 TIMES DAILY
Qty: 60 G | Refills: 3 | Status: SHIPPED | OUTPATIENT
Start: 2019-12-20

## 2019-12-20 NOTE — TELEPHONE ENCOUNTER
Received refill request for clobetasol 0.05% external cream and clindamycin 1% external lotrion as the resident on call. Reviewed patient's chart and attached communication. Patient last seen 12/19/19 for hidradenitis suppurativa. At that time, she was recommended to continue apply clobetasol 0.05% cream to individual lesions BID prn and clindamycin 1% gel for active lesions.  RTC scheduled for 1/23/20. After reviewing the medication list and assessment and plan from last visit, the refill request was accepted.    Darya Ruby  PGY-2 Dermatology Resident  Pager: [phone]

## 2019-12-20 NOTE — TELEPHONE ENCOUNTER
[facility] Call Center    Phone Message    May a detailed message be left on voicemail: yes    Reason for Call: Medication Refill Request    Has the patient contacted the pharmacy for the refill? Yes   Name of medication being requested: clobetasol (TEMOVATE) 0.05 % external cream and clindamycin (CLEOCIN T) 1 % external lotion  Provider who prescribed the medication:   [facility] [address] [place] [address]  Date medication is needed: asap     --pt needs an order sent for a bigger quantity  Thanks!      Action Taken: Message routed to:  [facility] ([facility]): derm

## 2020-01-23 ENCOUNTER — OFFICE VISIT (OUTPATIENT)
Dept: DERMATOLOGY | Facility: CLINIC | Age: 35
End: 2020-01-23
Payer: COMMERCIAL

## 2020-01-23 DIAGNOSIS — L73.2 HIDRADENITIS SUPPURATIVA: Primary | ICD-10-CM

## 2020-01-23 RX ORDER — SPIRONOLACTONE 100 MG/1
100 TABLET, FILM COATED ORAL 2 TIMES DAILY
Qty: 180 TABLET | Refills: 3 | Status: SHIPPED | OUTPATIENT
Start: 2020-01-23 | End: 2020-08-13

## 2020-01-23 ASSESSMENT — PAIN SCALES - GENERAL: PAINLEVEL: NO PAIN (0)

## 2020-01-23 NOTE — LETTER
1/23/2020       RE: Her De La Rosa  [address] [address]  [address]  [place] [address]     Dear Colleague,    Thank you for referring your patient, Her Rj, to the [facility] DERMATOLOGY at [facility]. Please see a copy of my visit note below.    [facility] Dermatology Note    Dermatology Clinic  [facility]  Clinics and Surgery Center  [address] [place] [id number]     Dermatology Problem List:  1. Hidradenitis suppurativa  - current tx: clobetasol, BPO wash, clindamycin, spironolactone at 200mg BID   - Currently controlling diabetes with 2000mg metformin  - A former smoker; quit ~2017  - Placed referral to derm surg for HS excision of L axilla  - s/p kenalog injections with significant relief    Encounter Date:  January 23, 2020     CC:       Chief Complaint   Patient presents with     Derm Problem       HS, groin, axillae        History of Present Illness:  Ms. Her De La Rosa is a 34 year old female who presents for followup for hidradenitis suppurativa.  The patient was last seen in clinic on 12/19/19 for HS followup. At the time she reported severe flare-up in the interim and went to the ED where she received steroid  injection with temporary relief. She was to continue on spironolactone 100 mg daily, topical clobetasol 0.05% cream on individual lesions BID prn for flares, BPO 10% wash, clindamycin 1% gel for active lesions, and given 12 pills of oxycodone for severe flares. She received ILK injection to 2x nodules. A referral had been placed to derm surgery for excision at the L axilla, and she saw Dr. Recio for excision consultation on 11/13/19. Excision was recommended, however, surgery is still pending.     Today she reports that the ILK injections were significantly beneficial and the HS cleared up. She has not had excision of the L axilla yet, which continues to drain and worsen. She places a band-aid and some pressure on it to ameliorate,  but it will sometimes get infected.    She would like to increase the spironolactone, and she feels it has helped to control and lessen the premenstrual flares. No side effects from the spironolactone. She reports that the topicals were also beneficial for the HS.    The patient is otherwise feeling well overall today. No other skin concerns at this time.       Social History:  Patient reports that she has quit smoking. She smoked 0.00 packs per day. She has never used smokeless tobacco.     Current Outpatient Medications   Medication     albuterol (PROVENTIL) 40 mg in sodium chloride 32 mL continuous nebulization solution     benzoyl peroxide (PANOXYL) 10 % external liquid     Blood Glucose Monitoring Suppl (GLUCOCOM BLOOD GLUCOSE MONITOR) CANDIDO     calcium citrate-vitamin D (CITRACAL) 315-250 MG-UNIT TABS per tablet     childrens multivitamin w/iron (FLINTSTONES COMPLETE) chewable tablet     clindamycin (CLEOCIN T) 1 % external lotion     clindamycin (CLINDAMAX) 1 % external gel     clobetasol (TEMOVATE) 0.05 % external cream     clobetasol (TEMOVATE) 0.05 % external cream     doxycycline monohydrate (MONODOX) 100 MG capsule     metFORMIN modified (GLUMETZA) 1000 MG 24 hr tablet     oxyCODONE (ROXICODONE) 5 MG tablet     oxyCODONE (ROXICODONE) 5 MG tablet     sertraline (ZOLOFT) 100 MG tablet     spironolactone (ALDACTONE) 50 MG tablet     topiramate (TOPAMAX) 25 MG tablet     vitamin C (ASCORBIC ACID) 500 MG tablet     Current Facility-Administered Medications   Medication     triamcinolone (KENALOG-40) injection 40 mg       No Known Allergies    Review of Systems:  -As per HPI  -Constitutional: Otherwise feeling well today, in usual state of health.  -Skin: As above in HPI. No additional skin concerns.     Physical exam:  Vitals: There were no vitals taken for this visit.  GEN: This is a well developed, well-nourished female in no acute distress, in a pleasant mood.    SKIN: Focused examination of lower  abdomen/suprapubic region focal exam   - 6.0 cm non-inflammatory cord, L axilla with underlying tunnel and 1x active tunnel in underlying axilla  -1x inflammatory papule and 1x draining tunnel on the R axilla  -Abdomen is clear  -3x inflammatory suprapubic nodules  -1x inflammatory nodule on the R groin  -1x inflammatory nodule on the L groin  -No other lesions of concern on areas examined.        Impression/Plan:  1. Hidradenitis suppurativa.     Mahmood II    Per patient, significant improvement from the last visit though the L axilla remains draining. She is pending surgical excision of the L axilla scheduled for next week.    The patient is interested in a clinical trial. Will have the research team touch base with them. Of note, she is planning to move out [facility] to the [facility] [place] this coming June or July and may not be eligible for the study.     Increased spironolactone  from 100 mg daily to 200 mg to be taken at 1 tablet twice daily.    Continue topical clobetasol 0.05% cream on individual lesions BID prn flares.    Continue BPO 10% wash.    Continue clindamycin 1% gel for active lesions.    Previously given oxycodone 12 pills for severe flares     The patient is pending excision with derm surgery for HS at the L axilla.    She will find a dermatologist in her area, [place], for ongoing care, and when she lets us know who this is, we can call and touch base with them.    Kenalog injection procedure note: After positioning and cleansing with isopropyl alcohol, 2.0 total cc of Kenalog 20 mg/cc was injected into 3x lesions on the suprapubic and 1x lesion on the L axilla.  The patient tolerated the procedure well and left the Dermatology clinic in good condition.        Follow-up in  3 months with Lorraine Lynch PA-C, and 6 months with me.        Staff Involved:    Scribe Disclosure  I, Ramona De La Rosa, am serving as a scribe to document services personally performed by Dr. Nemesio Farias, based  on data collection and the provider's statements to me.     Provider Disclosure:   The documentation recorded by the scribe accurately reflects the services I personally performed and the decisions made by me.    Nemesio Farias MD, FAAD    Departments of Internal Medicine and Dermatology  [facility]  [phone]

## 2020-01-23 NOTE — NURSING NOTE
Dermatology Rooming Note    Her De La Rosa's goals for this visit include:   Chief Complaint   Patient presents with     Derm Problem     Her is here for a HS follow up, states the injections helped.        Shea Talamantes LPN

## 2020-01-23 NOTE — PROGRESS NOTES
[facility] Dermatology Note    Dermatology Clinic  [facility]  Clinics and Surgery Center  [address] [place] [id number]     Dermatology Problem List:  1. Hidradenitis suppurativa  - current tx: clobetasol, BPO wash, clindamycin, spironolactone at 200mg BID   - Currently controlling diabetes with 2000mg metformin  - A former smoker; quit ~2017  - Placed referral to derm surg for HS excision of L axilla  - s/p kenalog injections with significant relief    Encounter Date: January 23, 2020     CC:       Chief Complaint   Patient presents with     Derm Problem       HS, groin, axillae        History of Present Illness:  Ms. Her De La Rosa is a 34 year old female who presents for followup for hidradenitis suppurativa. The patient was last seen in clinic on 12/19/19 for HS followup. At the time she reported severe flare-up in the interim and went to the ED where she received steroid  injection with temporary relief. She was to continue on spironolactone 100 mg daily, topical clobetasol 0.05% cream on individual lesions BID prn for flares, BPO 10% wash, clindamycin 1% gel for active lesions, and given 12 pills of oxycodone for severe flares. She received ILK injection to 2x nodules. A referral had been placed to derm surgery for excision at the L axilla, and she saw Dr. Recio for excision consultation on 11/13/19. Excision was recommended, however, surgery is still pending.     Today she reports that the ILK injections were significantly beneficial and the HS cleared up. She has not had excision of the L axilla yet, which continues to drain and worsen. She places a band-aid and some pressure on it to ameliorate, but it will sometimes get infected.    She would like to increase the spironolactone, and she feels it has helped to control and lessen the premenstrual flares. No side effects from the spironolactone. She reports that the topicals were also beneficial for the HS.    The  patient is otherwise feeling well overall today. No other skin concerns at this time.       Social History:  Patient reports that she has quit smoking. She smoked 0.00 packs per day. She has never used smokeless tobacco.     Current Outpatient Medications   Medication     albuterol (PROVENTIL) 40 mg in sodium chloride 32 mL continuous nebulization solution     benzoyl peroxide (PANOXYL) 10 % external liquid     Blood Glucose Monitoring Suppl (GLUCOCOM BLOOD GLUCOSE MONITOR) CANDIDO     calcium citrate-vitamin D (CITRACAL) 315-250 MG-UNIT TABS per tablet     childrens multivitamin w/iron (FLINTSTONES COMPLETE) chewable tablet     clindamycin (CLEOCIN T) 1 % external lotion     clindamycin (CLINDAMAX) 1 % external gel     clobetasol (TEMOVATE) 0.05 % external cream     clobetasol (TEMOVATE) 0.05 % external cream     doxycycline monohydrate (MONODOX) 100 MG capsule     metFORMIN modified (GLUMETZA) 1000 MG 24 hr tablet     oxyCODONE (ROXICODONE) 5 MG tablet     oxyCODONE (ROXICODONE) 5 MG tablet     sertraline (ZOLOFT) 100 MG tablet     spironolactone (ALDACTONE) 50 MG tablet     topiramate (TOPAMAX) 25 MG tablet     vitamin C (ASCORBIC ACID) 500 MG tablet     Current Facility-Administered Medications   Medication     triamcinolone (KENALOG-40) injection 40 mg       No Known Allergies    Review of Systems:  -As per HPI  -Constitutional: Otherwise feeling well today, in usual state of health.  -Skin: As above in HPI. No additional skin concerns.     Physical exam:  Vitals: There were no vitals taken for this visit.  GEN: This is a well developed, well-nourished female in no acute distress, in a pleasant mood.    SKIN: Focused examination of lower abdomen/suprapubic region focal exam   - 6.0 cm non-inflammatory cord, L axilla with underlying tunnel and 1x active tunnel in underlying axilla  -1x inflammatory papule and 1x draining tunnel on the R axilla  -Abdomen is clear  -3x inflammatory suprapubic nodules  -1x  inflammatory nodule on the R groin  -1x inflammatory nodule on the L groin  -No other lesions of concern on areas examined.        Impression/Plan:  1. Hidradenitis suppurativa.     Mahmood II    Per patient, significant improvement from the last visit though the L axilla remains draining. She is pending surgical excision of the L axilla scheduled for next week.    The patient is interested in a clinical trial. Will have the research team touch base with them. Of note, she is planning to move out of [place] to the [facility] [place] this coming June or July and may not be eligible for the study.     Increased spironolactone from 100 mg daily to 200 mg to be taken at 1 tablet twice daily.    Continue topical clobetasol 0.05% cream on individual lesions BID prn flares.    Continue BPO 10% wash.    Continue clindamycin 1% gel for active lesions.    Previously given oxycodone 12 pills for severe flares     The patient is pending excision with derm surgery for HS at the L axilla.    She will find a dermatologist in her area, [place], for ongoing care, and when she lets us know who this is, we can call and touch base with them.    Kenalog injection procedure note: After positioning and cleansing with isopropyl alcohol, 2.0 total cc of Kenalog 20 mg/cc was injected into 3x lesions on the suprapubic and 1x lesion on the L axilla.  The patient tolerated the procedure well and left the Dermatology clinic in good condition.        Follow-up in 3 months with Lorraine Lynch PA-C, and 6 months with me.        Staff Involved:    Scribe Disclosure  I, Ramona De La Rosa, am serving as a scribe to document services personally performed by Dr. Nemesio Farias, based on data collection and the provider's statements to me.     Provider Disclosure:   The documentation recorded by the scribe accurately reflects the services I personally performed and the decisions made by me.    Nemesio Farias MD, FAAD    Departments  of Internal Medicine and Dermatology  [facility]  [phone]

## 2020-01-23 NOTE — NURSING NOTE
Drug Administration Record    Prior to injection, verified patient identity using patient's name and date of birth.  Due to injection administration, patient instructed to remain in clinic for 15 minutes  afterwards, and to report any adverse reaction to me immediately.    Drug Name: triamcinolone acetonide(kenalog)  Dose: 2mL of triamcinolone 20mg/mL, 40mg dose  Route administered: ID  NDC #: [id number]: Kenalog-40 ([address]-5)  Amount of waste(mL):0  Reason for waste: Single use vial    LOT #: [id number]  SITE: body  : "[employer]   EXPIRATION DATE: 09/2021

## 2020-01-29 ENCOUNTER — OFFICE VISIT (OUTPATIENT)
Dept: DERMATOLOGY | Facility: CLINIC | Age: 35
End: 2020-01-29
Payer: COMMERCIAL

## 2020-01-29 DIAGNOSIS — L73.2 HIDRADENITIS SUPPURATIVA: Primary | ICD-10-CM

## 2020-01-29 PROCEDURE — 88305 TISSUE EXAM BY PATHOLOGIST: CPT | Mod: TC | Performed by: DERMATOLOGY

## 2020-01-29 RX ORDER — ACETAMINOPHEN AND CODEINE PHOSPHATE 300; 30 MG/1; MG/1
1-2 TABLET ORAL EVERY 4 HOURS PRN
Qty: 10 TABLET | Refills: 0 | Status: SHIPPED | OUTPATIENT
Start: 2020-01-29

## 2020-01-29 RX ORDER — ACETAMINOPHEN 500 MG
1000 TABLET ORAL ONCE
Status: COMPLETED | OUTPATIENT
Start: 2020-01-29 | End: 2020-01-29

## 2020-01-29 RX ADMIN — Medication 1000 MG: at 14:46

## 2020-01-29 ASSESSMENT — PAIN SCALES - GENERAL: PAINLEVEL: NO PAIN (0)

## 2020-01-29 NOTE — LETTER
1/29/2020       RE: Her De La Rosa  [address] [address]  [address]  [place] [address]     Dear Colleague,    Thank you for referring your patient, Her De La Rosa, to the [facility] DERMATOLOGIC SURGERY at [facility]. Please see a copy of my visit note below.    DERMATOLOGY EXCISION PROCEDURE NOTE    Dermatology Surgery Clinic  [facility]  [address] [place] [address]    NAME OF PROCEDURE: Excision secondary closure  Surgeon: Anjel Recio MD  Fellow: Wilbert Benjamin MD  PRE-OPERATIVE DIAGNOSIS:  Hidradenitis suppurativa  POST-OPERATIVE DIAGNOSIS: same   FINAL EXCISION SIZE(DEFECT SIZE): 12.0 x 9.0 cm, with 0 mm margin   FINAL REPAIR LENGTH: 12.0 x 9.0 cm     INDICATIONS: This patient presented with a 12.0 x 9.0 cm Hidradenitis suppurativa of the L axilla. Excision was indicated. We discussed the principles of treatment and most likely complications including scarring, bleeding, infection, incomplete excision, wound dehiscence, pain, nerve damage, and recurrence. Informed consent was obtained and the patient underwent the procedure as follows:    PROCEDURE: The patient was taken to the operative suite. Time-out was performed.  The treatment area was anesthetized with 1% lidocaine and epinephrine (1:100,000) 109 mL. The area was prepped with Chlorhexidine and rinsed with sterile saline and draped with sterile towels. The lesion was delineated and excised down to subcutaneous fat in a fusiform manner. Hemostasis was obtained by electrofulguration.     REPAIR: Granulation:  After surgery, the patient agreed to allow for the wound to heal via granulation. Estimated blood loss, minimal; complications, none; bandaging and wound care, routine. Postoperative length was 12.0 x 9.0 cm.     Patient was given written and verbal wound care procedures prior to discharge. Patient was discharged in good condition and will return for evaluation as  needed.    Follow-up as needed.            Pictures were placed in Pt's chart today for future reference.    Staff Involved:    Scribe Disclosure  I, Ramona De La Rosa, am serving as a scribe to document services personally performed by Dr. Anjel Recio, based on data collection and the provider's statements to me.       Attending attestation:  I was present for key elements of the procedure and immediately available for all other portions of the procedure.  I have reviewed the note and edited it as necessary.    Anjel Recio M.D.  Professor  Director of Dermatologic Surgery  Department of Dermatology  [facility]    Dermatology Surgery Clinic  [facility] [place] [phone]

## 2020-01-29 NOTE — PATIENT INSTRUCTIONS
Wound care instructions for Granulating Wounds      After 24 hours you should remove the bandage and begin daily dressing changes as follows:    1. Remove Dressing    2.   Clean and dry the area with a gentle cleanser. Pat wash and pat dry.     3.   Apply vaseline ointment/gauze over entire wound. Do NOT use neosporin ointment.    4.   Cover the wound with an ABD pad or a sterile non-stick gauze pad and tape it in place.     5. Take tylenol if you have any pain, just be careful not to exceed the 4,000 mg in 24 hours. Icing the area will also help with any discomfort or swelling.     Repeat these instructions at least once a day until the wound has completely healed.    No dietary restrictions.    Continue normal activity    The wound will not heal better when exposed to air and allowed to dry out. The wound will heal faster with a better cosmetic result if it is kept moist with ointment and covered with a bandage.    Do not let the wound dry out.    What to expect:    All wounds develop a small ring of redness surrounding the wound that indicate healing. Severe itching with extensive redness usually indicates sensitivity to the ointment or bandage tape used to dress the wound. You should call the Dermatology Clinic line if this occurs.    It is normal for there to be bruising or swelling around your surgical site, especially when it is near, or on, the eyelid.    If your wound is draining, this is normal. Larger wounds tend to drain more than smaller wounds. Please call if the draining is extensive or if there is yellow/green discharge. After about a week, the wound size should shrink. The wound is healed when you can see skin has formed over the entire area. A healed wound has a healthy shiny appearance and is red/dark pink in color. Wounds may take four to six weeks to heal. Larger wounds generally take a few weeks longer to heal than smaller wounds. Once the wound is healed, you may stop dressing  changes.    There may be a tightness as the wound heals, but this is normal and will gradually decrease and disappear.    Your wound may be sensitive to temperature changes after it is healed. Avoid extreme temperature changes if you are having discomfort, but this will improve with time.    If the wound seems to itch once it is healed, you may use vaseline to help relieve the itching.         Call Us If:  1. You have pain that is not controlled with Tylenol.  2. You have signs or symptoms of an infection, such as: fever over 100 degrees F, redness, warmth, or foul-smelling or yellow/creamy drainage from the wound.  Who should I call with questions?    [facility]: [phone]     [facility]: [phone]    For urgent needs outside of business hours call the [facility] at [phone] and ask for the dermatology resident on call

## 2020-01-30 ENCOUNTER — TELEPHONE (OUTPATIENT)
Dept: DERMATOLOGY | Facility: CLINIC | Age: 35
End: 2020-01-30

## 2020-01-30 VITALS — SYSTOLIC BLOOD PRESSURE: 121 MMHG | DIASTOLIC BLOOD PRESSURE: 87 MMHG | HEART RATE: 101 BPM

## 2020-01-30 ASSESSMENT — PAIN SCALES - GENERAL: PAINLEVEL: MILD PAIN (3)

## 2020-01-30 NOTE — NURSING NOTE
Vaseline gauze and gauze applied. Pressure dressing applied with ABD and tape. No bleeding noted. Denies pain.  Wound care instructions reviewed. Supplies given. All questions answered.     Latoya Tsai LPN

## 2020-01-30 NOTE — TELEPHONE ENCOUNTER
Follow up call completed following Mohs procedure with Dr. Recio    The following questions were asked:    What are you doing to manage your pain? The pain medication, she is worried that she might run out. She has taken 4 already. I advised her to call if she does run out.   Is it helping? Yes  Are you applying ice?  No, shes too scared to get it wt or use ice.   Have you had any noticeable bleeding through the bandage? She noticed bleeding, but it was manageable.    Do you have any concerns? I went over wound care.      The patient made an appointment with a wound nurse to help her care for the wounds.     Please call [phone] if you have any additional questions.

## 2020-02-03 ENCOUNTER — TELEPHONE (OUTPATIENT)
Dept: DERMATOLOGY | Facility: CLINIC | Age: 35
End: 2020-02-03

## 2020-02-03 LAB — COPATH REPORT: NORMAL

## 2020-02-03 NOTE — TELEPHONE ENCOUNTER
I called the patient and she has been seeing a wound care nurse to help with her bandage changes. She states that the Telfa gets stuck on the wound and causes her a lot of pain to the point of tears. The wound care nurse suggested something like Silvadene to help. The patient would like to know if she can try that? If so we need to place orders for wound care to apply it.   Felicia Clancy, CMA

## 2020-02-05 NOTE — TELEPHONE ENCOUNTER
Cassidy, Wilbert Crook MD  You; Anjel Recio MD Yesterday (8:02 AM)      Dinhsimi Felicia- thanks for the message.     We would be happy to chat with the wound care nurse if she is interested in discussing options for her.     We are not fans of Silvadene for a number of reasons. There are a couple things she could try to help with her issue: vaseline or iodine impregnated gauze, increased vaseline, mepilex foam, etc.     Thanks,   Wilbert Benjamin MD     Routing comment

## 2020-02-05 NOTE — TELEPHONE ENCOUNTER
I called the patient and she was given the information below. She states that she will just continue using Vaseline. If anything changes she will have her wound care nurse call us.   Felicia Clancy, [facility]

## 2020-02-07 RX ORDER — TRIAMCINOLONE ACETONIDE 40 MG/ML
40 INJECTION, SUSPENSION INTRA-ARTICULAR; INTRAMUSCULAR ONCE
Status: ACTIVE | OUTPATIENT
Start: 2020-02-07

## 2020-03-11 ENCOUNTER — HEALTH MAINTENANCE LETTER (OUTPATIENT)
Age: 35
End: 2020-03-11

## 2020-03-20 ENCOUNTER — TELEPHONE (OUTPATIENT)
Dept: DERMATOLOGY | Facility: CLINIC | Age: 35
End: 2020-03-20

## 2020-03-20 DIAGNOSIS — I49.8 BRUGADA SYNDROME: Primary | ICD-10-CM

## 2020-03-25 NOTE — TELEPHONE ENCOUNTER
Patient had a Brugada syndrome type III pattern on EKG on her research screening Referal to cardiology for further work-up.

## 2020-03-27 ENCOUNTER — TELEPHONE (OUTPATIENT)
Dept: DERMATOLOGY | Facility: CLINIC | Age: 35
End: 2020-03-27

## 2020-03-27 NOTE — TELEPHONE ENCOUNTER
"Teledermatology Nurse Call for RETURN patients seen within the last 3 years:    The patient was contacted by phone and we reviewed, \"Due to the coronavirus pandemic, we are calling to review your visit and offer you a teledermatology visit where you send in photos via CREAT[employer]. These photos will be seen by an MD or GUERA. This will be billed to you and your insurance.\"  The patient was also told that \"a teledermatology visit is not as thorough as an in-person visit and that the quality of the photograph sent may not be of the same quality as that taken by the dermatology clinic, but the patient would like to proceed with an teledermatology because of National Emergency Regarding Coronavirus disease (COVID 19) Outbreak.\"     The patient understood that they may receive a call from the clinic to review additional history, may still be instructed to come to clinic even after photo review and be billed for both visits with an MD. They were told that a photo assessment does not replace an in person skin exam. The patient understood that teledermatology is not for urgent issues and would require up to 72 hours for review. The patient denied skin pain, fever, mucosal symptoms (lesions, blisters, sores in the mouth, nose, eyes, or genitals)  IF PATIENT ENDORSES ANY OF THESE STOP AND PAGE  ON CALL ATTENDING. IF OTHER POSSIBLY URGENT SYMPTOMS THEN PAGE PHYSICIAN YOU ARE SCHEDULING WITH OR ON CALL IF NO ANSWER.     The patient chose to:  Consent to a teledermatology visit with OLXhart photos. The patient understood they must upload a photo for this visit to be completed. They indicated that the photo will be taken at their home address(if other address please document here) and the date of images will be TBD. The patient verbalized understanding that they and their insurance company would be billed for this visit with an MD. They patient verbalized understanding to the following: they may receive a call from the clinic to review " additional history, they may still be instructed to come to clinic even after photo review and be billed for both visits, photo assessment does not replace an in person skin exam, and photo quality can impact the physicians assessment. The patient was instructed to take photos of all all areas of concern and all areas of any rash.     Patient summary of issue:HS   Location of problem on body:Axillas  Timing:Chronic  What makes it better?:NA  What makes it worse?:NA  Which mediations have been tried, for how long, and did they make it better or worse (ex. Triamcinolone, used twice daily for 2 weeks, not improved):NA  List of refills requested:NA

## 2020-04-02 ENCOUNTER — VIRTUAL VISIT (OUTPATIENT)
Dept: DERMATOLOGY | Facility: CLINIC | Age: 35
End: 2020-04-02
Payer: COMMERCIAL

## 2020-04-02 DIAGNOSIS — L73.2 HIDRADENITIS SUPPURATIVA: Primary | ICD-10-CM

## 2020-04-02 RX ORDER — RIFAMPIN 300 MG/1
300 CAPSULE ORAL 2 TIMES DAILY
Qty: 180 CAPSULE | Refills: 0 | Status: SHIPPED | OUTPATIENT
Start: 2020-04-02 | End: 2020-07-29

## 2020-04-02 RX ORDER — CLINDAMYCIN HCL 300 MG
300 CAPSULE ORAL 2 TIMES DAILY
Qty: 180 CAPSULE | Refills: 0 | Status: SHIPPED | OUTPATIENT
Start: 2020-04-02 | End: 2020-07-29

## 2020-04-02 ASSESSMENT — PAIN SCALES - GENERAL: PAINLEVEL: NO PAIN (1)

## 2020-04-02 NOTE — NURSING NOTE
Chief Complaint   Patient presents with     Follow Up     HS follow up, doing well today     Carla Rodriguez, EMT

## 2020-04-02 NOTE — PROGRESS NOTES
"[facility] Dermatology Telephone Note    The patient has been notified of following:     \"This telephone visit will be conducted via a call between you and your physician/provider. We have found that certain health care needs can be provided without the need for a physical exam.  This service lets us provide the care you need with a short phone conversation.  If a prescription is necessary we can send it directly to your pharmacy.  If lab work is needed we can place an order for that and you can then stop by our lab to have the test done at a later time.    If during the course of the call the physician/provider feels a telephone visit is not appropriate, you will not be charged for this service.\"     Dermatology Problem List:  1. Hidradenitis suppurativa, Mahmood Stage II  - current tx: clobetasol, BPO wash, clindamycin, spironolactone at 200mg twice daily, oral clindamycin/rifampin (started 4/2/20)  - s/p HS surgery of L axilla 1/29/20  - Currently controlling diabetes with 2000mg metformin  - A former smoker; quit ~2017  - Placed referral to derm surg for HS excision of L axilla  - s/p kenalog injections with significant relief      Encounter Date: Apr 2, 2020    History of Present Illness:  Ms. Her De La Rosa is a 34 year old female who is being evaluated via a billable telephone visit.  She is here for HS follow up. She had HS surgery for the L axilla on 1/29/20. She reports her symptoms are largely the same. The L axilla is all healed, except for 1 spot that opened up. She has been applying vaseline to the area. Her right armpit is still active and significant leakage. She only uses benzoyl peroxide wash in the shower, as other soaps break her out. She thinks this helps the best, the clindamycin gel does not help as well. She is taking spironolactone 200 mg twice daily, which has decreased the number of episodes. She denies any new areas of involvement.    She is interested in starting adalimumab " today, since she's still having flares. The patient is otherwise in their usual state of health, with no other acute skin concerns. She has been practicing diligent social distancing.      The patient is otherwise in their usual state of health, with no other acute skin concerns.    I have reviewed and updated the patient's Past Medical History, Social History, Family History and Medication List.    Allergies:  Patient has no known allergies.    Digital Photo(s) Examination:  Clinical photographs of skin of the axillae reviewed in detail notable for:  - Well healed pink scar in the L axillae with one focal open area with mild serous fluid  - Multiple erythematous nodules in the R axilla with sinus tract     Assessment/Plan:  1. Hidradenitis suppurativa, Mahmood II - stable, unchanged on topical regimen. L axilla has healed well following HS surgery in Jan 2019. She has been practicing social distancing and is general self-isolated by choice. She is interested in starting a systemic medications.    - Discussed starting adalimumab for treatment of HS. We discussed risks and benefits including immunosuppression, injection site reactions, headaches, increased susceptibility to URI, TB reactivation. We also discussed trialing an aggressive regimen of high dose oral antibiotics, which the patient has not tried. We discussed common side effects including GI upset, C. Difficile, hepatitis (rifampin), change in color of urine. Patient opts for oral antibiotics for now in the setting of the global pandemic.  - Start clindamycin 300 mg twice daily and rifampin 300 mg twice daily x 90 days  - Continue spironolactone 200 mg twice daily   - Continue topical clobetasol 0.05% cream on individual lesions BID prn flares.  - Continue BPO 10% wash.  - Continue clindamycin 1% gel for active lesions.  - Will consider switching to adalimumab eventually once it is safe to do so in the setting of global COVID-19 pandemic    RTC in 1 month  for phone visit      Phone call contact time:  Call Started at 1412  Call Ended at 1433    Faculty: Dr. Farias    Staff Involved:  Resident/Staff    Shelby Chapin MD  PGY-3 Medicine-Dermatology  Pager [phone]      Staff Physician Comments:   I saw and evaluated the patient with the resident and I agree with the assessment and plan.  I was present for the phone visit.    Nemesio Farias MD, FAAD    Departments of Internal Medicine and Dermatology  [facility]  [phone]

## 2020-04-02 NOTE — PATIENT INSTRUCTIONS
[facility] Teledermatology Visit    Thank you for allowing us to participate in your care. Your findings are consistent with HS. Your instructions are as follows:  1. Oral clindamycin 300 mg twice daily  2. Oral rifampin 300 mg twice daily  3. Take with meals to decrease risk of GI upset  4. We will plan to switch to adalimumab eventually once it is safe in the setting of the pandemic  5. Continue spironolactone 200 mg twice daily  6. Continue BPO wash daily and clindamycin gel daily    Your follow-up instructions are as follows:  1. Follow-up with dermatology with phone call in 1 month    The patient must received the contact info below            When should I call my doctor?    If you are worsening or not improving, please, contact us or seek urgent care as noted below.     Who should I call with questions?    [facility]: [phone]     [facility]: [phone]    If this is a medical emergency and you are unable to reach an ER, Call [phone]

## 2020-05-07 ENCOUNTER — VIRTUAL VISIT (OUTPATIENT)
Dept: DERMATOLOGY | Facility: CLINIC | Age: 35
End: 2020-05-07
Payer: COMMERCIAL

## 2020-05-07 DIAGNOSIS — L73.2 HIDRADENITIS SUPPURATIVA: Primary | ICD-10-CM

## 2020-05-07 ASSESSMENT — PAIN SCALES - GENERAL: PAINLEVEL: NO PAIN (0)

## 2020-05-07 NOTE — PROGRESS NOTES
[facility] Dermatology Telephone Note    Dermatology Problem List:  1. Hidradenitis suppurativa, Ela Stage II  - current tx: clobetasol, BPO wash, clindamycin, spironolactone at 200mg twice daily, oral clindamycin/rifampin (started 4/2/20)  - s/p HS surgery of L axilla 1/29/20  - Currently controlling diabetes with 2000mg metformin  - A former smoker; quit ~2017  - Placed referral to derm surg for HS excision of L axilla  - s/p kenalog injections with significant relief     Encounter Date: May 7, 2020  Last visit: Apr 2, 2020      Interval hx  - Since starting the clindamycin/rifampin with spironolactone she has seen significant improvement especially her thighs.  A month ago she would see about 1-2 new lesions a month, she just recently got over a pretty severe new abscess in her rt axilla. Left axilla is still healing. She thinks her HS is still about a 5/10.  No side effects of meds.     History of Present Illness:4/2/20  MsSummer De La Rosa is a 34 year old female who is being evaluated via a billable telephone visit.  She is here for HS follow up. She had HS surgery for the L axilla on 1/29/20. She reports her symptoms are largely the same. The L axilla is all healed, except for 1 spot that opened up. She has been applying vaseline to the area. Her right armpit is still active and significant leakage. She only uses benzoyl peroxide wash in the shower, as other soaps break her out. She thinks this helps the best, the clindamycin gel does not help as well. She is taking spironolactone 200 mg twice daily, which has decreased the number of episodes. She denies any new areas of involvement.     She is interested in starting adalimumab today, since she's still having flares. The patient is otherwise in their usual state of health, with no other acute skin concerns. She has been practicing diligent social distancing.        The patient is otherwise in their usual state of health, with no other acute  skin concerns.     I have reviewed and updated the patient's Past Medical History, Social History, Family History and Medication List.     Allergies:  Patient has no known allergies.    Assessment/Plan:  1. Hidradenitis suppurativa, Mahmood II   - Improved in clindamcyin/rifamin but still with significant  impairmemt of quality of life (5/10)  - Will plan to continuie this regimen for 8 weeks and then will plan to start humira.  - Pt already had normal Hep B/C, neg HIV with screening for chemocentryx stud  - Continue spironolactone 200 mg twice daily   - Continue clindamycin/rifampin     2. Abnormal EKG  - Pt was checked by cardiologist and it was determined that she does not have Brugada syndrome.     RTC in 2 month for phone visit    Phone visist time: 10 min         Nemesio Farias MD, FAAD    Departments of Internal Medicine and Dermatology  [facility]  [phone]

## 2020-05-07 NOTE — PATIENT INSTRUCTIONS
[facility] Teledermatology Visit    Continue clindamycin 300mg twice a day and rifampin 300mg twice day   Continue spironolactone 200mg daily  Start humira injections in 1-2 months: 160mg (4 injections) at day 1, 80mg (2 injections) day 15, and then 40mg (1 injection every week starting on day 29    When should I call my doctor?    If you are worsening or not improving, please, contact us or seek urgent care as noted below.     Who should I call with questions (adults)?    [facility] (adult and pediatric): [phone]     [facility] (adult): [phone]    For urgent needs outside of business hours call the [facility] at [phone] and ask for the dermatology resident on call    If this is a medical emergency and you are unable to reach an ER, Call [phone]      Who should I call with questions (pediatric)?  [facility]- Pediatric Dermatology  Dr. Marsha Preston, Dr. Oswaldo Hughes, Dr. Zenobia Pham, Nina Rangel, PA  Dr. Velma Ty, Dr. Laura Green & Dr. Dorian Goodwin  Non Urgent  Nurse Triage Line; [phone]- bri and Perla RODRIGUES Care Coordinators   [place] (/Complex ) [phone]    If you need a prescription refill, please contact your pharmacy. Refills are approved or denied by our Physicians during normal business hours, Monday through Fridays  Per office policy, refills will not be granted if you have not been seen within the past year (or sooner depending on your child's condition)    Scheduling Information:  Pediatric Appointment Scheduling and Call Center [phone]  Radiology Scheduling- [phone]  Sedation Unit Scheduling- [phone]  [facility] Scheduling- General [phone]; Pediatric Dermatology [phone]  Main  Services: [phone]  Faroese: [phone]  Macedonian: [phone]  Hmong/Akash/Anjel: [phone]  Preadmission  Nursing Department Fax Number: [phone] (Fax all pre-operative paperwork to this number)    For urgent matters arising during evenings, weekends, or holidays that cannot wait for normal business hours please call [phone] and ask for the Dermatology Resident On-Call to be paged.

## 2020-05-07 NOTE — NURSING NOTE
Dermatology Rooming Note    Her De La Rosa's goals for this visit include:   Chief Complaint   Patient presents with     Derm Problem     Her is following up for HS, states it's improving, but had a flare up last week, and believes it was due to consuming alcohol.       Shea Talamantes LPN

## 2020-05-11 ENCOUNTER — TELEPHONE (OUTPATIENT)
Dept: DERMATOLOGY | Facility: CLINIC | Age: 35
End: 2020-05-11

## 2020-05-11 NOTE — TELEPHONE ENCOUNTER
PA Initiation    Medication: HUMIRA - PA submitted  Insurance Company: [facility] - Phone [phone] Fax [phone]  Pharmacy Filling the Rx: [facility] - [place], [place] - [address]  Filling Pharmacy Phone:    Filling Pharmacy Fax:    Start Date: 5/11/2020

## 2020-05-13 NOTE — TELEPHONE ENCOUNTER
PRIOR AUTHORIZATION DENIED    Medication: HUMIRA - PA DENIED    Denial Date: 5/12/2020    Denial Rational: Ins needs TB test results.    Appeal Information: See denial letter below:

## 2020-05-20 NOTE — TELEPHONE ENCOUNTER
Do I need to reorder the humira now? She had it done and should be uploaded in our system  Nemesio

## 2020-05-28 NOTE — TELEPHONE ENCOUNTER
Paola Meeks; Nemesio Farias MD 27 minutes ago (2:17 PM)       Hello,     I reached out to the insurance company to see if a verbal is s acceptable in lieu of paperwork, but the plan requires that we fax in the information for a redetermination.     I am still waiting for the plan to send me the fax to re-initiate the PA request.     If you have any questions, I can be reached at [phone]     Thank you!

## 2020-07-29 DIAGNOSIS — L73.2 HIDRADENITIS SUPPURATIVA: ICD-10-CM

## 2020-07-29 RX ORDER — CLINDAMYCIN HCL 300 MG
300 CAPSULE ORAL 2 TIMES DAILY
Qty: 120 CAPSULE | Refills: 0 | Status: SHIPPED | OUTPATIENT
Start: 2020-07-29 | End: 2020-11-30

## 2020-07-29 RX ORDER — RIFAMPIN 300 MG/1
300 CAPSULE ORAL 2 TIMES DAILY
Qty: 120 CAPSULE | Refills: 0 | Status: SHIPPED | OUTPATIENT
Start: 2020-07-29

## 2020-07-29 RX ORDER — BENZOYL PEROXIDE 10 G/100G
SUSPENSION TOPICAL
Qty: 227 G | Refills: 3 | Status: SHIPPED | OUTPATIENT
Start: 2020-07-29 | End: 2020-11-30

## 2020-07-29 NOTE — TELEPHONE ENCOUNTER
Received refill request for clindamycin, rifampin and BPO as the resident on call. Reviewed patient's chart and attached communication. Patient last seen 05/2020 for HS with plan for continued use until able to transition to humira at follow-up visit. RTC early 08/2020. Will fill two month supply to bridge.     After reviewing the medication list and assessment and plan from last visit, the refill request was accepted.    CC'ing Dr. Farias as OWEN Herzog MD  Internal Medicine - Dermatology PGY 2  [phone]

## 2020-07-29 NOTE — TELEPHONE ENCOUNTER
Spoke with Her and she notes that she needs a refill of the Rifampin, Clindamycin and the Benzoyl peroxide wash.    Routing to [facility] to fulfil the order.

## 2020-07-29 NOTE — TELEPHONE ENCOUNTER
[facility] Call Center    Phone Message    May a detailed message be left on voicemail: yes     Reason for Call: Medication Refill Request    Has the patient contacted the pharmacy for the refill? Yes   Name of medication being requested: rifampin (RIFADIN) 300 MG capsule     Provider who prescribed the medication: Dr Farias     Pharmacy: [facility]  Address: [address] [place] [address]  Phone: [phone]      Date medication is needed: asap. Pt states she needs refill by tomorrow because she is going out of town.      Action Taken: Message routed to:  [facility] derm    Travel Screening: Not Applicable

## 2020-08-13 ENCOUNTER — VIRTUAL VISIT (OUTPATIENT)
Dept: DERMATOLOGY | Facility: CLINIC | Age: 35
End: 2020-08-13
Payer: COMMERCIAL

## 2020-08-13 DIAGNOSIS — L73.2 HIDRADENITIS SUPPURATIVA: ICD-10-CM

## 2020-08-13 RX ORDER — SPIRONOLACTONE 100 MG/1
100 TABLET, FILM COATED ORAL 2 TIMES DAILY
Qty: 180 TABLET | Refills: 3 | Status: SHIPPED | OUTPATIENT
Start: 2020-08-13 | End: 2020-11-30

## 2020-08-13 ASSESSMENT — PAIN SCALES - GENERAL: PAINLEVEL: MILD PAIN (2)

## 2020-08-13 NOTE — PATIENT INSTRUCTIONS
[facility] Dermatology Visit    Start humira: 160mg (4 injections) day 1, 80mg (2 injections) day 15 the start 40mg (1 injection)  weekly on ay 29.   When should I call my doctor?    If you are worsening or not improving, please, contact us or seek urgent care as noted below.     Who should I call with questions (adults)?    [facility] (adult and pediatric): [phone]     [facility] (adult): [phone]    For urgent needs outside of business hours call the [facility] at [phone] and ask for the dermatology resident on call    If this is a medical emergency and you are unable to reach an ER, Call [phone]      Who should I call with questions (pediatric)?  [facility]- Pediatric Dermatology  Dr. Marsha Preston, Dr. Oswaldo Hughes, Dr. Zenobia Pham, Nina Rangel, PA  Dr. Velma Ty, Dr. Laura Green & Dr. Dorian Goodwin  Non Urgent  Nurse Triage Line; [phone]- Nati and Perla RODRIGUES Care Coordinators   Kadie (/Complex ) [phone]    If you need a prescription refill, please contact your pharmacy. Refills are approved or denied by our Physicians during normal business hours, Monday through Fridays  Per office policy, refills will not be granted if you have not been seen within the past year (or sooner depending on your child's condition)    Scheduling Information:  Pediatric Appointment Scheduling and Call Center [phone]  Radiology Scheduling- [phone]  Sedation Unit Scheduling- [phone]  [address] Scheduling- General [phone]; Pediatric Dermatology [phone]  Main  Services: [phone]  Telugu: [phone]  Portuguese: [phone]  Hmong/Spanish/Anjel: [phone]  Preadmission Nursing Department Fax Number: [phone] (Fax all pre-operative paperwork to this number)    For urgent matters arising during evenings, weekends,  or holidays that cannot wait for normal business hours please call [phone] and ask for the Dermatology Resident On-Call to be paged.

## 2020-08-13 NOTE — LETTER
8/13/2020       RE: Her De La Rosa  [address] [address]  [address]  [place] [address]     Dear Colleague,    Thank you for referring your patient, Her Rj, to the [facility] DERMATOLOGY at [facility]. Please see a copy of my visit note below.    [facility] Dermatology Record:  Store and Forward and Telephone [phone]      [facility] Dermatology Telephone Note     Dermatology Problem List:  1. Hidradenitis suppurativa, Mahmood Stage II  - current tx: clobetasol, BPO wash, clindamycin, spironolactone at 200mg twice daily, oral clindamycin/rifampin (started 4/2/20)  - s/p HS surgery of L axilla 1/29/20  - Currently controlling diabetes with 2000mg metformin  - A former smoker; quit ~2017  - Placed referral to derm surg for HS excision of L axilla  - s/p kenalog injections with significant relief     Encounter Date: August 13, 2020  Last visit: May 7, 2020      Interval Hx  8/13/20  - She has been only on rifampin for the last few months with spironolactone. She did flare a week ago, but before that she was doing fine. She has continued on the spionolactone 200mg daily. Just took a trip to [place].    Interval hx May 7, 2020  - Since starting the clindamycin/rifampin with spironolactone she has seen significant improvement especially her thighs.  A month ago she would see about 1-2 new lesions a month, she just recently got over a pretty severe new abscess in her rt axilla. Left axilla is still healing. She thinks her HS is still about a 5/10.  No side effects of meds.      History of Present Illness:4/2/20  MsSummer De La Rosa is a 34 year old female who is being evaluated via a billable telephone visit.  She is here for HS follow up. She had HS surgery for the L axilla on 1/29/20. She reports her symptoms are largely the same. The L axilla is all healed, except for 1 spot that opened up. She has been applying vaseline to the area. Her right armpit is still active and  significant leakage. She only uses benzoyl peroxide wash in the shower, as other soaps break her out. She thinks this helps the best, the clindamycin gel does not help as well. She is taking spironolactone 200 mg twice daily, which has decreased the number of episodes. She denies any new areas of involvement.     She is interested in starting adalimumab today, since she's still having flares. The patient is otherwise in their usual state of health, with no other acute skin concerns. She has been practicing diligent social distancing.        The patient is otherwise in their usual state of health, with no other acute skin concerns.     I have reviewed and updated the patient's Past Medical History, Social History, Family History and Medication List.     Allergies:  Patient has no known allergies.     Assessment/Plan:  1. Hidradenitis suppurativa, Mahmood II  - discontinue rifampin.  - Start humira today  - WIll f/u in 2 months  - Continue spironolactone 200 mg twice daily   - Pt planning on moving to [facility]. Will plan to transfer care at her next visit.     2. Abnormal EKG  - Pt was checked by cardiologist and it was determined that she does not have Brugada syndrome.      RTC in 2month for phone visit     Phone visist time: 9 min      Nemesio Farias MD, FAAD    Departments of Internal Medicine and Dermatology  [facility]  [phone]  _____________________________________________________________________________    Teledermatology information:  - Location of patient: [place]  - Patient presented as: return  - Location of teledermatologist:  ([facility] DERMATOLOGY )  - Reason teledermatology is appropriate:  of National Emergency Regarding Coronavirus disease (COVID 19) Outbreak  - Image quality and interpretability: None  - Physician has received verbal consent for a Video/Photos Visit from the patient? Yes  - In-person dermatology visit recommendation: no  - Date of images:  August 13, 2020  - Service start time:12:48  - Service end time:12:57  - Date of report: 8/13/2020       Again, thank you for allowing me to participate in the care of your patient.      Sincerely,    Nemesio Farias MD

## 2020-08-13 NOTE — NURSING NOTE
Chief Complaint   Patient presents with     Derm Problem     HS follow up, took roadtrip and came back and HS has gotten worse. pain 1 or 2 today      Carla Rodriguez, EMT

## 2020-08-13 NOTE — PROGRESS NOTES
[facility] Dermatology Record:  Store and Forward and Telephone [phone]      [facility] Dermatology Telephone Note     Dermatology Problem List:  1. Hidradenitis suppurativa, Mahmood Stage II  - current tx: clobetasol, BPO wash, clindamycin, spironolactone at 200mg twice daily, oral clindamycin/rifampin (started 4/2/20)  - s/p HS surgery of L axilla 1/29/20  - Currently controlling diabetes with 2000mg metformin  - A former smoker; quit ~2017  - Placed referral to derm surg for HS excision of L axilla  - s/p kenalog injections with significant relief     Encounter Date: August 13, 2020  Last visit: May 7, 2020      Interval Hx  8/13/20  - She has been only on rifampin for the last few months with spironolactone. She did flare a week ago, but before that she was doing fine. She has continued on the spionolactone 200mg daily. Just took a trip to [place].    Interval hx May 7, 2020  - Since starting the clindamycin/rifampin with spironolactone she has seen significant improvement especially her thighs.  A month ago she would see about 1-2 new lesions a month, she just recently got over a pretty severe new abscess in her rt axilla. Left axilla is still healing. She thinks her HS is still about a 5/10.  No side effects of meds.      History of Present Illness:4/2/20  MsSummer De La Rosa is a 34 year old female who is being evaluated via a billable telephone visit.  She is here for HS follow up. She had HS surgery for the L axilla on 1/29/20. She reports her symptoms are largely the same. The L axilla is all healed, except for 1 spot that opened up. She has been applying vaseline to the area. Her right armpit is still active and significant leakage. She only uses benzoyl peroxide wash in the shower, as other soaps break her out. She thinks this helps the best, the clindamycin gel does not help as well. She is taking spironolactone 200 mg twice daily, which has decreased the number of episodes. She denies  any new areas of involvement.     She is interested in starting adalimumab today, since she's still having flares. The patient is otherwise in their usual state of health, with no other acute skin concerns. She has been practicing diligent social distancing.        The patient is otherwise in their usual state of health, with no other acute skin concerns.     I have reviewed and updated the patient's Past Medical History, Social History, Family History and Medication List.     Allergies:  Patient has no known allergies.     Assessment/Plan:  1. Hidradenitis suppurativa, Mahmood II  - discontinue rifampin.  - Start humira today  - WIll f/u in 2 months  - Continue spironolactone 200 mg twice daily   - Pt planning on moving to [facility]. Will plan to transfer care at her next visit.     2. Abnormal EKG  - Pt was checked by cardiologist and it was determined that she does not have Brugada syndrome.      RTC in 2month for phone visit     Phone visist time: 9 min      Nemesio Farias MD, FAAD    Departments of Internal Medicine and Dermatology  [facility]  [phone]  _____________________________________________________________________________    Teledermatology information:  - Location of patient: [place]  - Patient presented as: return  - Location of teledermatologist:  ([facility] DERMATOLOGY )  - Reason teledermatology is appropriate:  of National Emergency Regarding Coronavirus disease (COVID 19) Outbreak  - Image quality and interpretability: None  - Physician has received verbal consent for a Video/Photos Visit from the patient? Yes  - In-person dermatology visit recommendation: no  - Date of images: August 13, 2020  - Service start time:12:48  - Service end time:12:57  - Date of report: 8/13/2020

## 2020-08-18 ENCOUNTER — TELEPHONE (OUTPATIENT)
Dept: DERMATOLOGY | Facility: CLINIC | Age: 35
End: 2020-08-18

## 2020-08-18 DIAGNOSIS — L73.2 HIDRADENITIS SUPPURATIVA: ICD-10-CM

## 2020-08-18 NOTE — TELEPHONE ENCOUNTER
[facility] Call Center    Phone Message    May a detailed message be left on voicemail: yes     Reason for Call: Medication Question or concern regarding medication   Prescription Clarification  Name of Medication: adalimumab (HUMIRA PEN) 40 MG/0.8ML pen kit     Prescribing Provider:    Pharmacy: [id number] - [place], [place] - [address] [place], [place] [phone]   What on the order needs clarification? Pharmacy is calling, wondering if pt is getting samples of the loading dose, or a prescription, please call [facility], thanks          Action Taken: Message routed to:  [facility] derm    Travel Screening: Not Applicable

## 2020-08-18 NOTE — TELEPHONE ENCOUNTER
Patient to start Humira. Can you please check insurance coverage?    Thank you,    Shea Talamantes LPN

## 2020-08-18 NOTE — TELEPHONE ENCOUNTER
Routing to [facility] to place loading dose.    Dr. Keene, can you also send the maintenance dose for Humira to the [facility]. (it was sent to [facility])    PA still needs to be started.

## 2020-08-18 NOTE — TELEPHONE ENCOUNTER
Received refill request for Humira. Patient chart reviewed. Refill accepted. Rx routed to Dr. Dinora Keene MD  Dermatology Resident  [facility]

## 2020-08-19 NOTE — TELEPHONE ENCOUNTER
PA Initiation    Medication: Humira  Insurance Company: [facility] - Phone [phone] Fax [phone]  Pharmacy Filling the Rx: [facility] - [place], [place] - [facility] [address]  Filling Pharmacy Phone:    Filling Pharmacy Fax:    Start Date: 8/19/2020    PA submitted for weekly dosing Humira.

## 2020-08-20 ENCOUNTER — TELEPHONE (OUTPATIENT)
Dept: DERMATOLOGY | Facility: CLINIC | Age: 35
End: 2020-08-20

## 2020-08-20 NOTE — TELEPHONE ENCOUNTER
[facility] Call [facility]    Phone Message    May a detailed message be left on voicemail: yes     Reason for Call: Medication Question or concern regarding medication   Prescription Clarification  Name of Medication: adalimumab (HUMIRA PEN) 40 MG/0.8ML pen kit /  adalimumab (HUMIRA *CF*) 40 MG/0.4ML pen kit       Prescribing Provider: Yuki Keene MD    Pharmacy: [facility] - [place] [address] [address]     What on the order needs clarification? They Pt already has the starter kit previously but [facility] never got the maintenance dosage Rx because it was sent to [facility] instead.    Did we intend the Pt to get a second starter kit?  Did we intend to send the Maintenance dose to [facility] or should it have been [facility]?          Action Taken: Message routed to:  [facility] dermatology    Travel Screening: Not Applicable

## 2020-08-21 NOTE — TELEPHONE ENCOUNTER
Prior Authorization Not Needed per Insurance    Medication: Humira PA not needed  Insurance Company: [employer] [place] - Phone [phone] Fax [phone]  Pharmacy Filling the Rx: [facility]

## 2020-08-25 ENCOUNTER — TELEPHONE (OUTPATIENT)
Dept: DERMATOLOGY | Facility: CLINIC | Age: 35
End: 2020-08-25

## 2020-08-25 NOTE — TELEPHONE ENCOUNTER
[facility] Call [facility]    Phone Message    May a detailed message be left on voicemail: yes     Reason for Call: Medication Question or concern regarding medication   Prescription Clarification  Name of Medication: adalimumab (HUMIRA *CF*) 40 MG/0.4ML pen kit  Prescribing Provider:    Yuki Keene MD     [facility] [address]    What on the order needs clarification? Pharmacy tried to explain to me.   They need us to send them the physician release form for the recipient lock in program before they can do Rx.  Fax [phone]    [phone]    I hope that makes sense to you.          Action Taken: Message routed to:  [facility] dermatology    Travel Screening: Not Applicable

## 2020-08-26 NOTE — TELEPHONE ENCOUNTER
Left a detailed voicemail for insurance company saying that we need them to fax us this form so that Dr. Farias can sign it, and we can send to patient's pharmacy. Clinic fax and phone number provided.    Shea Talamantes LPN

## 2020-08-27 NOTE — TELEPHONE ENCOUNTER
[facility] Call Center    Phone Message    May a detailed message be left on voicemail: yes     Reason for Call: Medication Question or concern regarding medication   Prescription Clarification  Name of Medication: adalimumab (HUMIRA *CF*) 40 MG/0.4ML pen kit  Prescribing Provider: Dinora   Pharmacy:    [facility] [address]     What on the order needs clarification? Per [employer] - patient requires a referral from pt pcp stating it is okay for Dr. Farias to prescribe medications for patient since she has a [employer] restricted plan     Please reach out to Carey Carney for referral/ order @ [phone] or have pt do this step           Action Taken: Message routed to:  [facility] derm    Travel Screening: Not Applicable

## 2020-08-27 NOTE — TELEPHONE ENCOUNTER
[facility] - [facility] - called.  She explained that it is not a form that they need. The pt is locked in with her PCP for care under the Recipient Lock In Process. In order for her to get coverage, we have to create a referral to her PCP to allow her to get coverage here. Allie states the PCP faxed us a referral recently. You can call the Recipient lock in program at # [phone]. Fax # [phone] if you have questions. Also, please call [facility]ie at [phone] at the [facility]. Thanks.

## 2020-08-27 NOTE — TELEPHONE ENCOUNTER
I spoke to Allie at the pharmacy and she told me to call the Recipient lock in program.     I called the Recipient lock in program at [phone] and left a voicemail asking to call us back to let us know how we can get Dr. Farias on the patient's restricted recipient program, so she can get the medication he prescribed her. Said in the message that according to our notes, Dr. Devante Chao referred her to Dr. Farias. Clinic number provided for call back.    Shea Talamantes LPN

## 2020-10-16 ENCOUNTER — TELEPHONE (OUTPATIENT)
Dept: DERMATOLOGY | Facility: CLINIC | Age: 35
End: 2020-10-16

## 2020-10-16 NOTE — TELEPHONE ENCOUNTER
----- Message from Nemesio Farias MD sent at 10/15/2020  9:28 AM CDT -----  Regarding: FW: Patient 49 days late to fill medication notification  Can we try to get in touch we her,.  Nemesio  ----- Message -----  From: Renetta Radha  Sent: 10/5/2020   1:11 PM CDT  To: Nemesio Farias MD, #  Subject: Patient 49 days late to fill medication noti#    Patient Late to Fill Notification      Clinic, [facility] or Appropriate Staff:    Medication Name/Strength: Humira 40mg/0.8ml    Medication Last Fill Date: 07/20/2020    We are unable to reach the patient to place their refill order, and they are overdue for their medication at this time according to our records.    If you have any additional information about the current status of this medication, or if a change in therapy occurred (On Hold, Change in Med, Discontinued), please let us know by:    Replying to this message with information, or...  Phone: [phone] or [phone]    This communication was also shared with the Medication Therapy Management team at [facility].  This team of clinical pharmacists is able to meet with patients and assist them with adherence concerns listed above.    If you feel your patient would benefit from meeting with a [facility] pharmacist please place a referral in EPIC.      Thank you for allowing [facility] to service your patients.    Regards,    [facility] Staff

## 2020-10-16 NOTE — TELEPHONE ENCOUNTER
JURGEN on 10/8 and again today for patient asking to call us back to let us know if she was able to get her Humira or not. Clinic number provided for call back.      Shea Talamantes LPN

## 2020-10-22 NOTE — TELEPHONE ENCOUNTER
Pharmacy called.  They cannot fill this Rx for Humira because there is a lock on her account to only go through another doctor.  They have tried to contact the Pt to get the other MD added but no answer.    Please contact the pharmacy

## 2020-11-30 DIAGNOSIS — L73.2 HIDRADENITIS SUPPURATIVA: ICD-10-CM

## 2020-11-30 RX ORDER — CLINDAMYCIN HCL 300 MG
300 CAPSULE ORAL 2 TIMES DAILY
Qty: 60 CAPSULE | Refills: 2 | Status: SHIPPED | OUTPATIENT
Start: 2020-11-30

## 2020-11-30 RX ORDER — BENZOYL PEROXIDE 10 G/100G
SUSPENSION TOPICAL
Qty: 227 G | Refills: 2 | Status: SHIPPED | OUTPATIENT
Start: 2020-11-30

## 2020-11-30 RX ORDER — SPIRONOLACTONE 100 MG/1
100 TABLET, FILM COATED ORAL 2 TIMES DAILY
Qty: 180 TABLET | Refills: 2 | Status: SHIPPED | OUTPATIENT
Start: 2020-11-30

## 2020-11-30 NOTE — TELEPHONE ENCOUNTER
Remaining refills for Spironolactone, Benzo/peroxide and humira sent to requested pharmacy.    Last Clinic Visit: 8-  Cleocin not addressed on plan of clinic note.      Kathleen M Doege RN

## 2020-11-30 NOTE — TELEPHONE ENCOUNTER
[facility] Call [facility]    Phone Message    May a detailed message be left on voicemail: yes     Reason for Call: Medication Refill Request    Has the patient contacted the pharmacy for the refill? Yes   Name of medication being requested: spironolactone (ALDACTONE) 100 MG tablet  benzoyl peroxide (PANOXYL) 10 % external liquid  adalimumab (HUMIRA *CF*) 40 MG/0.4ML pen kit  clindamycin (CLEOCIN) 300 MG capsule    wellProvider who prescribed the medication:  Dr Farias   Pharmacy: [place] [address], [place],  ; Phone: [phone]      Date medication is needed: ASAP pt is out of all her medications          Action Taken: Message routed to:  [facility] derm     Travel Screening: Not Applicable

## 2020-12-01 ENCOUNTER — TELEPHONE (OUTPATIENT)
Dept: DERMATOLOGY | Facility: CLINIC | Age: 35
End: 2020-12-01

## 2020-12-01 NOTE — TELEPHONE ENCOUNTER
Prescription was released to [facility] in [place].  Per my understanding [place] Medicaid will not cover an out of state provider to prescribe.  Patient will need to have a [place] provider prescribe this.  Sending message to nurse and provider to inform.

## 2020-12-01 NOTE — TELEPHONE ENCOUNTER
Bahman sent to patient. I spoke with the pharmacy and since pt has state insurance in [place] Dr. Farias can not prescribe any medications.    HERLINDA Dubon

## 2020-12-01 NOTE — TELEPHONE ENCOUNTER
Received refill request for oral clindamycin as the resident on call. Reviewed patient's chart and attached communication. Patient last seen August 2020 for HS. Discussed with attending who is okay with 3 month refill and telephone follow up in 3 months.    Routing to derm pool for scheduling.  CC'ing attending as KARISSA Hathaway DO (PGY-2)  Dermatology Resident  [facility]

## 2020-12-09 ENCOUNTER — MYC MEDICAL ADVICE (OUTPATIENT)
Dept: DERMATOLOGY | Facility: CLINIC | Age: 35
End: 2020-12-09

## 2020-12-09 DIAGNOSIS — L73.2 HIDRADENITIS SUPPURATIVA: Primary | ICD-10-CM

## 2020-12-22 ENCOUNTER — TELEPHONE (OUTPATIENT)
Dept: DERMATOLOGY | Facility: CLINIC | Age: 35
End: 2020-12-22

## 2020-12-22 NOTE — TELEPHONE ENCOUNTER
Centralized Medication Refill Team note:  adalimumab (HUMIRA PEN) 40 MG/0.8ML pen kit   Last Written Prescription Date:  11/30/20  Last Fill Quantity: 6 pen,   # refills: 2  Last Office Visit : 8/13/20  Future Office visit:  None        [facility] [employer] called stating the pt now has [place] medical assistance, and Dr Farais is not covered under that plan. Do we want to re-write the prescriptions under another MD? Please call [facility] ro discuss. Thanks.     Reviewed: Per reviews of recent [employer] messages, patient is now in [place] with [place] via [place] and a referral to  Dr. Maylin Bills MD at [facility] is in place.   Patient has seen local dermatologist for humira recently( no name in message)    Resolution: Called [facility]: Spoke to .  uggested that [facility] contact patient for the name of the local dermatologist as [facility] unable to provide medication under [facility]..    [phone]    MANE Choudhary, RSummerN.   [facility] Centralized Medication Refill Department

## 2020-12-22 NOTE — TELEPHONE ENCOUNTER
[facility] Call Center    Phone Message    May a detailed message be left on voicemail: yes     Reason for Call: Other: [facility] [employer] called stating the pt now has [facility] assistance, and Dr Farias is not covered under that plan. Do we want to re-write the prescriptions under another MD? Please call [facility] ro discuss. Thanks.     Action Taken: Message routed to:  [facility]  dermatology    Travel Screening: Not Applicable

## 2021-01-03 ENCOUNTER — HEALTH MAINTENANCE LETTER (OUTPATIENT)
Age: 36
End: 2021-01-03

## 2021-04-07 ENCOUNTER — TELEPHONE (OUTPATIENT)
Dept: PLASTIC SURGERY | Facility: CLINIC | Age: 36
End: 2021-04-07

## 2021-04-07 NOTE — TELEPHONE ENCOUNTER
LVM for pt regarding referral from Dr. Farias for [facility]. Left call center number if patient is still needing scheduling. Sent [employer].

## 2021-04-25 ENCOUNTER — HEALTH MAINTENANCE LETTER (OUTPATIENT)
Age: 36
End: 2021-04-25

## 2021-10-10 ENCOUNTER — HEALTH MAINTENANCE LETTER (OUTPATIENT)
Age: 36
End: 2021-10-10

## 2022-05-21 ENCOUNTER — HEALTH MAINTENANCE LETTER (OUTPATIENT)
Age: 37
End: 2022-05-21

## 2022-09-15 NOTE — PROGRESS NOTES
DERMATOLOGY EXCISION PROCEDURE NOTE    Dermatology Surgery Clinic  [facility]  [address] [place] [id number]    NAME OF PROCEDURE: Excision secondary closure  Surgeon: Anjel Recio MD  Fellow: Wilbert Benjamin MD  PRE-OPERATIVE DIAGNOSIS:  Hidradenitis suppurativa  POST-OPERATIVE DIAGNOSIS: same   FINAL EXCISION SIZE(DEFECT SIZE): 12.0 x 9.0 cm, with 0 mm margin   FINAL REPAIR LENGTH: 12.0 x 9.0 cm     INDICATIONS: This patient presented with a 12.0 x 9.0 cm Hidradenitis suppurativa of the L axilla. Excision was indicated. We discussed the principles of treatment and most likely complications including scarring, bleeding, infection, incomplete excision, wound dehiscence, pain, nerve damage, and recurrence. Informed consent was obtained and the patient underwent the procedure as follows:    PROCEDURE: The patient was taken to the operative suite. Time-out was performed.  The treatment area was anesthetized with 1% lidocaine and epinephrine (1:100,000) 109 mL. The area was prepped with Chlorhexidine and rinsed with sterile saline and draped with sterile towels. The lesion was delineated and excised down to subcutaneous fat in a fusiform manner. Hemostasis was obtained by electrofulguration.     REPAIR: Granulation:  After surgery, the patient agreed to allow for the wound to heal via granulation. Estimated blood loss, minimal; complications, none; bandaging and wound care, routine. Postoperative length was 12.0 x 9.0 cm.     Patient was given written and verbal wound care procedures prior to discharge. Patient was discharged in good condition and will return for evaluation as needed.    Follow-up as needed.            Pictures were placed in Pt's chart today for future reference.    Staff Involved:    Scribe Disclosure  I, Ramona De La Rosa, am serving as a scribe to document services personally performed by Dr. Anjel Recio, based on data collection and the provider's  statements to me.       Attending attestation:  I was present for key elements of the procedure and immediately available for all other portions of the procedure.  I have reviewed the note and edited it as necessary.    Anjel Recio M.D.  Professor  Director of Dermatologic Surgery  Department of Dermatology  [facility]    Dermatology Surgery Clinic  [facility] [address] [address]\                   0

## 2022-09-18 ENCOUNTER — HEALTH MAINTENANCE LETTER (OUTPATIENT)
Age: 37
End: 2022-09-18

## 2023-07-30 ENCOUNTER — HEALTH MAINTENANCE LETTER (OUTPATIENT)
Age: 38
End: 2023-07-30

## 2025-02-10 ENCOUNTER — TELEPHONE (OUTPATIENT)
Dept: DERMATOLOGY | Facility: CLINIC | Age: 40
End: 2025-02-10

## 2025-02-10 NOTE — TELEPHONE ENCOUNTER
2/10 Patient confirmed scheduled appointment:  Date: 8/7/2025  Time: 2:00 pm  Visit type: Return Hidradenitis Suppura  Provider: Dinora  [place] [facility] [place]: n/a  Additional notes: pt stated that she's outa state and will be moving to [place] by the summer time
